# Patient Record
Sex: FEMALE | Race: WHITE | NOT HISPANIC OR LATINO | Employment: FULL TIME | URBAN - NONMETROPOLITAN AREA
[De-identification: names, ages, dates, MRNs, and addresses within clinical notes are randomized per-mention and may not be internally consistent; named-entity substitution may affect disease eponyms.]

---

## 2019-10-02 ENCOUNTER — HISTORICAL (OUTPATIENT)
Dept: ADMINISTRATIVE | Facility: HOSPITAL | Age: 59
End: 2019-10-02

## 2019-11-07 LAB — CRC RECOMMENDATION EXT: NORMAL

## 2020-09-18 LAB
BILIRUB SERPL-MCNC: NEGATIVE MG/DL
BLOOD URINE, POC: NORMAL
CLARITY, POC UA: CLEAR
COLOR, POC UA: YELLOW
GLUCOSE UR QL STRIP: NEGATIVE
KETONES UR QL STRIP: NEGATIVE
LEUKOCYTE EST, POC UA: NORMAL
NITRITE, POC UA: NEGATIVE
PH, POC UA: 7
PROTEIN, POC: NEGATIVE
SPECIFIC GRAVITY, POC UA: 1.02
UROBILINOGEN, POC UA: NORMAL

## 2022-04-11 ENCOUNTER — HISTORICAL (OUTPATIENT)
Dept: ADMINISTRATIVE | Facility: HOSPITAL | Age: 62
End: 2022-04-11

## 2022-04-29 VITALS
HEIGHT: 63 IN | DIASTOLIC BLOOD PRESSURE: 68 MMHG | SYSTOLIC BLOOD PRESSURE: 110 MMHG | BODY MASS INDEX: 19.77 KG/M2 | WEIGHT: 111.56 LBS

## 2022-07-14 ENCOUNTER — HISTORICAL (OUTPATIENT)
Dept: ADMINISTRATIVE | Facility: HOSPITAL | Age: 62
End: 2022-07-14

## 2022-07-14 LAB — BCS RECOMMENDATION EXT: NORMAL

## 2022-09-21 ENCOUNTER — HISTORICAL (OUTPATIENT)
Dept: ADMINISTRATIVE | Facility: HOSPITAL | Age: 62
End: 2022-09-21

## 2023-01-13 ENCOUNTER — DOCUMENTATION ONLY (OUTPATIENT)
Dept: ADMINISTRATIVE | Facility: HOSPITAL | Age: 63
End: 2023-01-13
Payer: COMMERCIAL

## 2023-02-22 ENCOUNTER — TELEPHONE (OUTPATIENT)
Dept: FAMILY MEDICINE | Facility: CLINIC | Age: 63
End: 2023-02-22

## 2023-02-22 ENCOUNTER — OFFICE VISIT (OUTPATIENT)
Dept: FAMILY MEDICINE | Facility: CLINIC | Age: 63
End: 2023-02-22
Payer: COMMERCIAL

## 2023-02-22 VITALS
HEART RATE: 67 BPM | DIASTOLIC BLOOD PRESSURE: 60 MMHG | OXYGEN SATURATION: 98 % | HEIGHT: 63 IN | TEMPERATURE: 99 F | BODY MASS INDEX: 22.04 KG/M2 | SYSTOLIC BLOOD PRESSURE: 128 MMHG | WEIGHT: 124.38 LBS

## 2023-02-22 DIAGNOSIS — L98.9 SKIN LESION: Primary | ICD-10-CM

## 2023-02-22 DIAGNOSIS — H60.11 CELLULITIS OF AURICLE OF RIGHT EAR: Primary | ICD-10-CM

## 2023-02-22 DIAGNOSIS — Z00.01 ABNORMAL WELLNESS EXAM: ICD-10-CM

## 2023-02-22 PROBLEM — E78.00 ELEVATED LOW DENSITY LIPOPROTEIN (LDL) CHOLESTEROL LEVEL: Status: ACTIVE | Noted: 2023-02-22

## 2023-02-22 PROBLEM — G57.90 NEUROPATHY OF LOWER EXTREMITY: Status: ACTIVE | Noted: 2023-02-22

## 2023-02-22 PROBLEM — Z86.0100 HISTORY OF COLONIC POLYPS: Status: ACTIVE | Noted: 2023-02-22

## 2023-02-22 PROBLEM — H66.90 OTITIS: Status: ACTIVE | Noted: 2023-02-22

## 2023-02-22 PROBLEM — R73.03 PREDIABETES: Status: ACTIVE | Noted: 2023-02-22

## 2023-02-22 PROBLEM — Z86.010 HISTORY OF COLONIC POLYPS: Status: ACTIVE | Noted: 2023-02-22

## 2023-02-22 PROBLEM — Z80.3 FAMILY HISTORY OF BREAST CANCER IN FEMALE: Status: ACTIVE | Noted: 2023-02-22

## 2023-02-22 PROBLEM — Z87.891 HISTORY OF SMOKING: Status: ACTIVE | Noted: 2023-02-22

## 2023-02-22 PROCEDURE — 99213 OFFICE O/P EST LOW 20 MIN: CPT | Mod: ,,, | Performed by: NURSE PRACTITIONER

## 2023-02-22 PROCEDURE — 99213 PR OFFICE/OUTPT VISIT, EST, LEVL III, 20-29 MIN: ICD-10-PCS | Mod: ,,, | Performed by: NURSE PRACTITIONER

## 2023-02-22 RX ORDER — TRIAMCINOLONE ACETONIDE 1 MG/G
1 CREAM TOPICAL 2 TIMES DAILY
COMMUNITY
Start: 2022-12-22 | End: 2023-02-22 | Stop reason: SDUPTHER

## 2023-02-22 RX ORDER — TRIAMCINOLONE ACETONIDE 1 MG/G
1 CREAM TOPICAL 2 TIMES DAILY
Qty: 1 EACH | Refills: 0 | Status: SHIPPED | OUTPATIENT
Start: 2023-02-22 | End: 2023-09-28

## 2023-02-22 RX ORDER — CLINDAMYCIN HYDROCHLORIDE 300 MG/1
300 CAPSULE ORAL EVERY 6 HOURS
Qty: 40 CAPSULE | Refills: 0 | Status: SHIPPED | OUTPATIENT
Start: 2023-02-22 | End: 2023-03-04

## 2023-02-22 RX ORDER — IBUPROFEN 600 MG/1
600 TABLET ORAL EVERY 8 HOURS PRN
COMMUNITY
Start: 2022-09-20

## 2023-02-22 NOTE — PROGRESS NOTES
"Patient ID: Angelique Maguire  : 1960     Chief Complaint: sores in ear    Allergies: Patient is allergic to adhesive.     History of Present Illness:  The patient is a 62 y.o. White female who presents to clinic for evaluation and management with a chief complaint of sores in ear   Patient with history of infected bumps on right ear 3 months ago. Resolved with antibiotics and creams. Went to [place] last week and noticed tender bump to same ear. Now has a few bumps, denies any drainage, changes in hearing, fever.        Past Medical History:  has no past medical history on file.    Social History:  reports that she has quit smoking. Her smoking use included cigarettes. She has never used smokeless tobacco.    Care Team: Patient Care Team:  LUCAS Rubalcava as PCP - General (Family Medicine)  Domi Mcdonald     Current Medications:  Current Outpatient Medications   Medication Instructions    clindamycin (CLEOCIN) 300 mg, Oral, Every 6 hours    ibuprofen (ADVIL,MOTRIN) 600 mg, Oral, Every 8 hours PRN    triamcinolone acetonide 0.1% (KENALOG) 1 g, Topical (Top), 2 times daily       Review of Systems   See HPI    Visit Vitals  /60   Pulse 67   Temp 98.8 °F (37.1 °C)   Ht 5' 3" (1.6 m)   Wt 56.4 kg (124 lb 6.4 oz)   LMP  (LMP Unknown)   SpO2 98%   BMI 22.04 kg/m²       Physical Exam  Vitals reviewed.   Constitutional:       General: She is not in acute distress.     Appearance: Normal appearance.   HENT:      Head: Normocephalic and atraumatic.      Right Ear: Tympanic membrane and ear canal normal.      Left Ear: Tympanic membrane, ear canal and external ear normal.      Ears:        Comments: Few pimple like lesions to ryan of right external ear with surrounding erythema and increased warmth. No drainage. Tenderness with palpation.     Nose: Nose normal. No congestion.      Mouth/Throat:      Mouth: Mucous membranes are moist.      Pharynx: Oropharynx is clear. No oropharyngeal exudate or " posterior oropharyngeal erythema.   Eyes:      Conjunctiva/sclera: Conjunctivae normal.   Cardiovascular:      Rate and Rhythm: Normal rate and regular rhythm.      Pulses: Normal pulses.      Heart sounds: No murmur heard.  Pulmonary:      Effort: Pulmonary effort is normal.      Breath sounds: Normal breath sounds.   Musculoskeletal:      Cervical back: Normal range of motion and neck supple.   Lymphadenopathy:      Cervical: No cervical adenopathy.   Skin:     General: Skin is warm and dry.      Capillary Refill: Capillary refill takes less than 2 seconds.      Coloration: Skin is not jaundiced.   Neurological:      Mental Status: She is alert and oriented to person, place, and time.      Cranial Nerves: No cranial nerve deficit.   Psychiatric:         Mood and Affect: Mood normal.         Behavior: Behavior normal.          Assessment & Plan:  1. Cellulitis of auricle of right ear  Comments:  complete abx as prescribed.     Other orders  -     clindamycin (CLEOCIN) 300 MG capsule; Take 1 capsule (300 mg total) by mouth every 6 (six) hours. for 10 days  Dispense: 40 capsule; Refill: 0         Future Appointments   Date Time Provider Department Center   9/26/2023  9:30 AM LUCAS Rubalcava [facility] Mcdonald [facility]     Lab Frequency Next Occurrence   CBC Auto Differential Once 08/22/2023   Comprehensive Metabolic Panel Once 08/22/2023   Lipid Panel Once 08/22/2023   TSH Once 08/22/2023   Hemoglobin A1C Once 08/22/2023       Follow up in about 7 months (around 9/22/2023) for Wellness, Labs prior to apt. Call sooner if needed.    LUCAS RUBALCAVA

## 2023-02-22 NOTE — TELEPHONE ENCOUNTER
Pt called stated she forgot to ask for a refill on her triamcinolone cream    ----- Message from Marie Guardado sent at 2/22/2023 12:15 PM CST -----  Regarding: refill  Pt forgot to ask for refill on cream she doesn't know the name but it starts with the letter t

## 2023-04-13 ENCOUNTER — OFFICE VISIT (OUTPATIENT)
Dept: FAMILY MEDICINE | Facility: CLINIC | Age: 63
End: 2023-04-13
Payer: COMMERCIAL

## 2023-04-13 VITALS
OXYGEN SATURATION: 98 % | TEMPERATURE: 98 F | BODY MASS INDEX: 22.93 KG/M2 | HEIGHT: 63 IN | SYSTOLIC BLOOD PRESSURE: 118 MMHG | DIASTOLIC BLOOD PRESSURE: 60 MMHG | WEIGHT: 129.38 LBS | HEART RATE: 83 BPM

## 2023-04-13 DIAGNOSIS — R31.9 PAINLESS HEMATURIA: Primary | ICD-10-CM

## 2023-04-13 DIAGNOSIS — R31.9 HEMATURIA, UNSPECIFIED TYPE: ICD-10-CM

## 2023-04-13 PROBLEM — H66.90 OTITIS: Status: RESOLVED | Noted: 2023-02-22 | Resolved: 2023-04-13

## 2023-04-13 LAB
BILIRUB SERPL-MCNC: NORMAL MG/DL
BLOOD URINE, POC: NORMAL
CLARITY, POC UA: CLEAR
COLOR, POC UA: YELLOW
GLUCOSE UR QL STRIP: NORMAL
KETONES UR QL STRIP: NORMAL
LEUKOCYTE ESTERASE URINE, POC: NORMAL
NITRITE, POC UA: NORMAL
PH, POC UA: 6
PROTEIN, POC: NORMAL
SPECIFIC GRAVITY, POC UA: 1.01
UROBILINOGEN, POC UA: 0.2

## 2023-04-13 PROCEDURE — 3074F SYST BP LT 130 MM HG: CPT | Mod: CPTII,,, | Performed by: NURSE PRACTITIONER

## 2023-04-13 PROCEDURE — 3008F PR BODY MASS INDEX (BMI) DOCUMENTED: ICD-10-PCS | Mod: CPTII,,, | Performed by: NURSE PRACTITIONER

## 2023-04-13 PROCEDURE — 99213 PR OFFICE/OUTPT VISIT, EST, LEVL III, 20-29 MIN: ICD-10-PCS | Mod: ,,, | Performed by: NURSE PRACTITIONER

## 2023-04-13 PROCEDURE — 99213 OFFICE O/P EST LOW 20 MIN: CPT | Mod: ,,, | Performed by: NURSE PRACTITIONER

## 2023-04-13 PROCEDURE — 1160F PR REVIEW ALL MEDS BY PRESCRIBER/CLIN PHARMACIST DOCUMENTED: ICD-10-PCS | Mod: CPTII,,, | Performed by: NURSE PRACTITIONER

## 2023-04-13 PROCEDURE — 3078F DIAST BP <80 MM HG: CPT | Mod: CPTII,,, | Performed by: NURSE PRACTITIONER

## 2023-04-13 PROCEDURE — 81002 POCT URINE DIPSTICK WITHOUT MICROSCOPE: ICD-10-PCS | Mod: ,,, | Performed by: NURSE PRACTITIONER

## 2023-04-13 PROCEDURE — 1159F PR MEDICATION LIST DOCUMENTED IN MEDICAL RECORD: ICD-10-PCS | Mod: CPTII,,, | Performed by: NURSE PRACTITIONER

## 2023-04-13 PROCEDURE — 81002 URINALYSIS NONAUTO W/O SCOPE: CPT | Mod: ,,, | Performed by: NURSE PRACTITIONER

## 2023-04-13 PROCEDURE — 3078F PR MOST RECENT DIASTOLIC BLOOD PRESSURE < 80 MM HG: ICD-10-PCS | Mod: CPTII,,, | Performed by: NURSE PRACTITIONER

## 2023-04-13 PROCEDURE — 87088 URINE BACTERIA CULTURE: CPT | Performed by: NURSE PRACTITIONER

## 2023-04-13 PROCEDURE — 3008F BODY MASS INDEX DOCD: CPT | Mod: CPTII,,, | Performed by: NURSE PRACTITIONER

## 2023-04-13 PROCEDURE — 1160F RVW MEDS BY RX/DR IN RCRD: CPT | Mod: CPTII,,, | Performed by: NURSE PRACTITIONER

## 2023-04-13 PROCEDURE — 1159F MED LIST DOCD IN RCRD: CPT | Mod: CPTII,,, | Performed by: NURSE PRACTITIONER

## 2023-04-13 PROCEDURE — 3074F PR MOST RECENT SYSTOLIC BLOOD PRESSURE < 130 MM HG: ICD-10-PCS | Mod: CPTII,,, | Performed by: NURSE PRACTITIONER

## 2023-04-13 RX ORDER — CYCLOBENZAPRINE HCL 10 MG
10 TABLET ORAL 3 TIMES DAILY PRN
COMMUNITY
Start: 2023-03-05 | End: 2023-09-28

## 2023-04-13 RX ORDER — NITROFURANTOIN 25; 75 MG/1; MG/1
100 CAPSULE ORAL 2 TIMES DAILY
Qty: 10 CAPSULE | Refills: 0 | Status: SHIPPED | OUTPATIENT
Start: 2023-04-13 | End: 2023-09-28

## 2023-04-13 RX ORDER — NAPROXEN 500 MG/1
500 TABLET ORAL 2 TIMES DAILY
COMMUNITY
Start: 2023-04-07 | End: 2023-09-28

## 2023-04-13 NOTE — PROGRESS NOTES
Patient ID: Angelique Maguire  : 1960     Chief Complaint: Hematuria    Allergies: Patient is allergic to adhesive.     History of Present Illness:  The patient is a 62 y.o. White female who presents to clinic for evaluation and management with a chief complaint of Hematuria   Patient reports noting blood in urine for one day about 2 months ago. Recurred once last week and again this am. Does have some mild intermittent right lower abdominal/groin pain but believes that is related to her back. Denies urinary frequency, dysuria, urgency, hesitancy, difficulty passing urine, cloudy urine, fever, n/v/d, constipation. Denies vaginal discharge. History of total hysterectomy.        Past Medical History:  has no past medical history on file.    Social History:  reports that she has quit smoking. Her smoking use included cigarettes. She has never used smokeless tobacco.    Care Team: Patient Care Team:  LUCAS Rubalcava as PCP - General (Family Medicine)  Domi Mcdonald     Current Medications:  Current Outpatient Medications   Medication Instructions    cyclobenzaprine (FLEXERIL) 10 mg, Oral, 3 times daily PRN    ibuprofen (ADVIL,MOTRIN) 600 mg, Oral, Every 8 hours PRN    naproxen (NAPROSYN) 500 mg, Oral, 2 times daily    nitrofurantoin, macrocrystal-monohydrate, (MACROBID) 100 MG capsule 100 mg, Oral, 2 times daily    triamcinolone acetonide 0.1% (KENALOG) 1,000 mg, Topical (Top), 2 times daily       Review of Systems   Constitutional:  Negative for appetite change, fatigue, fever and unexpected weight change.   HENT:  Negative for nasal congestion, ear pain, facial swelling, hearing loss, mouth sores, nosebleeds, sore throat and trouble swallowing.    Eyes:  Negative for pain, discharge, redness and visual disturbance.   Respiratory:  Negative for cough, chest tightness and shortness of breath.    Cardiovascular:  Negative for chest pain, palpitations and leg swelling.   Gastrointestinal:  Negative for  "abdominal pain, blood in stool, constipation, diarrhea and nausea.   Endocrine: Negative for cold intolerance, heat intolerance, polydipsia, polyphagia and polyuria.   Genitourinary:  Positive for hematuria. Negative for difficulty urinating, dysuria, flank pain, frequency, urgency, vaginal bleeding, vaginal discharge and vaginal pain.   Musculoskeletal:  Negative for joint swelling and joint deformity.   Integumentary:  Negative for color change, rash and mole/lesion.   Neurological:  Negative for dizziness, weakness, headaches and memory loss.   Hematological:  Negative for adenopathy. Does not bruise/bleed easily.   Psychiatric/Behavioral:  Negative for confusion, sleep disturbance and suicidal ideas.       Visit Vitals  /60   Pulse 83   Temp 97.5 °F (36.4 °C)   Ht 5' 2.99" (1.6 m)   Wt 58.7 kg (129 lb 6.4 oz)   LMP  (LMP Unknown)   SpO2 98%   BMI 22.93 kg/m²       Physical Exam  Vitals reviewed.   Constitutional:       General: She is not in acute distress.     Appearance: Normal appearance.   HENT:      Head: Normocephalic and atraumatic.      Nose: Nose normal. No congestion.      Mouth/Throat:      Mouth: Mucous membranes are moist.      Pharynx: Oropharynx is clear.   Eyes:      Conjunctiva/sclera: Conjunctivae normal.   Cardiovascular:      Rate and Rhythm: Normal rate and regular rhythm.      Pulses: Normal pulses.   Pulmonary:      Effort: Pulmonary effort is normal.      Breath sounds: Normal breath sounds.   Abdominal:      General: Bowel sounds are normal.      Palpations: Abdomen is soft.      Tenderness: There is abdominal tenderness in the suprapubic area and left lower quadrant.   Skin:     General: Skin is warm and dry.      Coloration: Skin is not jaundiced.      Findings: No rash.   Neurological:      Mental Status: She is alert and oriented to person, place, and time.      Cranial Nerves: No cranial nerve deficit.   Psychiatric:         Mood and Affect: Mood normal.         Behavior: " Behavior normal.      Assessment & Plan:  1. Painless hematuria  Comments:  discussed possible causes, obtain u/s , begin macrobid while awaiting urine culture results. Patient will consider urology referral   Orders:  -     nitrofurantoin, macrocrystal-monohydrate, (MACROBID) 100 MG capsule; Take 1 capsule (100 mg total) by mouth 2 (two) times daily.  Dispense: 10 capsule; Refill: 0  -     Urine culture  -     US Retroperitoneal Complete; Future; Expected date: 04/13/2023    2. Hematuria, unspecified type  -     POCT URINE DIPSTICK WITHOUT MICROSCOPE         Future Appointments   Date Time Provider Department Center   9/20/2023  8:00 AM LAB, [facility] LABORATORY DRAW STATION [facility] ZHOU Cruz   9/28/2023  8[facility]30 [facility]       Follow up if symptoms worsen or fail to improve, for Keep Apt as Scheduled. Call sooner if needed.    LUCAS RUBALCAVA    Lab Frequency Next Occurrence   US Retroperitoneal Complete Once 04/13/2023   CBC Auto Differential Once 08/22/2023   Comprehensive Metabolic Panel Once 08/22/2023   Lipid Panel Once 08/22/2023   TSH Once 08/22/2023   Hemoglobin A1C Once 08/22/2023

## 2023-04-16 LAB — BACTERIA UR CULT: NO GROWTH

## 2023-04-17 ENCOUNTER — TELEPHONE (OUTPATIENT)
Dept: FAMILY MEDICINE | Facility: CLINIC | Age: 63
End: 2023-04-17
Payer: COMMERCIAL

## 2023-04-17 NOTE — TELEPHONE ENCOUNTER
----- Message from LUCAS Rubalcava sent at 4/13/2023 11:38 AM CDT -----  Please f/u on urine culture. Placed patient on macrobid bid on Thursday 4/13/23.

## 2023-04-18 ENCOUNTER — TELEPHONE (OUTPATIENT)
Dept: FAMILY MEDICINE | Facility: CLINIC | Age: 63
End: 2023-04-18
Payer: COMMERCIAL

## 2023-04-18 NOTE — TELEPHONE ENCOUNTER
----- Message from LUCAS Rubalcava sent at 4/18/2023  3:06 PM CDT -----  Notify patient test results are normal. No growth in urine culture. Any further blood in urine? Does she want to see a urologist?

## 2023-04-18 NOTE — PROGRESS NOTES
Notify patient test results are normal. No growth in urine culture. Any further blood in urine? Does she want to see a urologist?

## 2023-04-18 NOTE — TELEPHONE ENCOUNTER
Pt states she is feeling better and denies having blood in her urine. She states that she does not have a urologist.

## 2023-04-19 ENCOUNTER — TELEPHONE (OUTPATIENT)
Dept: FAMILY MEDICINE | Facility: CLINIC | Age: 63
End: 2023-04-19
Payer: COMMERCIAL

## 2023-04-19 ENCOUNTER — HOSPITAL ENCOUNTER (OUTPATIENT)
Dept: RADIOLOGY | Facility: HOSPITAL | Age: 63
Discharge: HOME | End: 2023-04-19
Attending: NURSE PRACTITIONER
Payer: COMMERCIAL

## 2023-04-19 DIAGNOSIS — R31.9 PAINLESS HEMATURIA: ICD-10-CM

## 2023-04-19 PROCEDURE — 76770 US EXAM ABDO BACK WALL COMP: CPT | Mod: TC

## 2023-04-19 NOTE — PROGRESS NOTES
Notify patient us of kidneys and bladder is normal. No further orders. Keep next appointment as scheduled.

## 2023-04-19 NOTE — TELEPHONE ENCOUNTER
----- Message from LUCAS Rubalcava sent at 4/19/2023  3:40 PM CDT -----  Notify patient us of kidneys and bladder is normal. No further orders. Keep next appointment as scheduled.

## 2023-09-20 PROCEDURE — 83036 HEMOGLOBIN GLYCOSYLATED A1C: CPT | Performed by: NURSE PRACTITIONER

## 2023-09-20 PROCEDURE — 80061 LIPID PANEL: CPT | Performed by: NURSE PRACTITIONER

## 2023-09-20 PROCEDURE — 85025 COMPLETE CBC W/AUTO DIFF WBC: CPT | Performed by: NURSE PRACTITIONER

## 2023-09-20 PROCEDURE — 84443 ASSAY THYROID STIM HORMONE: CPT | Performed by: NURSE PRACTITIONER

## 2023-09-20 PROCEDURE — 80053 COMPREHEN METABOLIC PANEL: CPT | Performed by: NURSE PRACTITIONER

## 2023-09-28 ENCOUNTER — OFFICE VISIT (OUTPATIENT)
Dept: FAMILY MEDICINE | Facility: CLINIC | Age: 63
End: 2023-09-28
Payer: COMMERCIAL

## 2023-09-28 VITALS
BODY MASS INDEX: 23 KG/M2 | OXYGEN SATURATION: 97 % | DIASTOLIC BLOOD PRESSURE: 60 MMHG | WEIGHT: 129.81 LBS | HEART RATE: 102 BPM | TEMPERATURE: 99 F | SYSTOLIC BLOOD PRESSURE: 112 MMHG | HEIGHT: 63 IN

## 2023-09-28 DIAGNOSIS — Z12.2 SCREENING FOR LUNG CANCER: ICD-10-CM

## 2023-09-28 DIAGNOSIS — E78.49 OTHER HYPERLIPIDEMIA: ICD-10-CM

## 2023-09-28 DIAGNOSIS — Z00.00 WELLNESS EXAMINATION: Primary | ICD-10-CM

## 2023-09-28 DIAGNOSIS — Z12.31 SCREENING MAMMOGRAM FOR BREAST CANCER: ICD-10-CM

## 2023-09-28 DIAGNOSIS — R73.03 PREDIABETES: ICD-10-CM

## 2023-09-28 DIAGNOSIS — J06.9 ACUTE UPPER RESPIRATORY INFECTION: ICD-10-CM

## 2023-09-28 DIAGNOSIS — M54.16 LUMBAR RADICULOPATHY: ICD-10-CM

## 2023-09-28 DIAGNOSIS — Z23 IMMUNIZATION DUE: ICD-10-CM

## 2023-09-28 PROCEDURE — 1159F MED LIST DOCD IN RCRD: CPT | Mod: CPTII,,, | Performed by: NURSE PRACTITIONER

## 2023-09-28 PROCEDURE — 99396 PR PREVENTIVE VISIT,EST,40-64: ICD-10-PCS | Mod: 25,,, | Performed by: NURSE PRACTITIONER

## 2023-09-28 PROCEDURE — 3078F PR MOST RECENT DIASTOLIC BLOOD PRESSURE < 80 MM HG: ICD-10-PCS | Mod: CPTII,,, | Performed by: NURSE PRACTITIONER

## 2023-09-28 PROCEDURE — 90686 FLU VACCINE (QUAD) GREATER THAN OR EQUAL TO 3YO PRESERVATIVE FREE IM: ICD-10-PCS | Mod: ,,, | Performed by: NURSE PRACTITIONER

## 2023-09-28 PROCEDURE — 1160F RVW MEDS BY RX/DR IN RCRD: CPT | Mod: CPTII,,, | Performed by: NURSE PRACTITIONER

## 2023-09-28 PROCEDURE — 90471 FLU VACCINE (QUAD) GREATER THAN OR EQUAL TO 3YO PRESERVATIVE FREE IM: ICD-10-PCS | Mod: ,,, | Performed by: NURSE PRACTITIONER

## 2023-09-28 PROCEDURE — 1159F PR MEDICATION LIST DOCUMENTED IN MEDICAL RECORD: ICD-10-PCS | Mod: CPTII,,, | Performed by: NURSE PRACTITIONER

## 2023-09-28 PROCEDURE — 3074F PR MOST RECENT SYSTOLIC BLOOD PRESSURE < 130 MM HG: ICD-10-PCS | Mod: CPTII,,, | Performed by: NURSE PRACTITIONER

## 2023-09-28 PROCEDURE — 3078F DIAST BP <80 MM HG: CPT | Mod: CPTII,,, | Performed by: NURSE PRACTITIONER

## 2023-09-28 PROCEDURE — 3008F PR BODY MASS INDEX (BMI) DOCUMENTED: ICD-10-PCS | Mod: CPTII,,, | Performed by: NURSE PRACTITIONER

## 2023-09-28 PROCEDURE — 3044F HG A1C LEVEL LT 7.0%: CPT | Mod: CPTII,,, | Performed by: NURSE PRACTITIONER

## 2023-09-28 PROCEDURE — 3074F SYST BP LT 130 MM HG: CPT | Mod: CPTII,,, | Performed by: NURSE PRACTITIONER

## 2023-09-28 PROCEDURE — 1160F PR REVIEW ALL MEDS BY PRESCRIBER/CLIN PHARMACIST DOCUMENTED: ICD-10-PCS | Mod: CPTII,,, | Performed by: NURSE PRACTITIONER

## 2023-09-28 PROCEDURE — 90471 IMMUNIZATION ADMIN: CPT | Mod: ,,, | Performed by: NURSE PRACTITIONER

## 2023-09-28 PROCEDURE — 90686 IIV4 VACC NO PRSV 0.5 ML IM: CPT | Mod: ,,, | Performed by: NURSE PRACTITIONER

## 2023-09-28 PROCEDURE — 3044F PR MOST RECENT HEMOGLOBIN A1C LEVEL <7.0%: ICD-10-PCS | Mod: CPTII,,, | Performed by: NURSE PRACTITIONER

## 2023-09-28 PROCEDURE — 3008F BODY MASS INDEX DOCD: CPT | Mod: CPTII,,, | Performed by: NURSE PRACTITIONER

## 2023-09-28 PROCEDURE — 99396 PREV VISIT EST AGE 40-64: CPT | Mod: 25,,, | Performed by: NURSE PRACTITIONER

## 2023-09-28 NOTE — PROGRESS NOTES
Patient ID: Angelique Maguire  : 1960    Chief Complaint: wellness     Allergies: Patient is allergic to adhesive.     History of Present Illness:  The patient is a 63 y.o. White female who presents to clinic for annual wellness visit.    Diet and nutrition:  Diet is high in salt, high in fat, low in fiber, high caloric intake, high carbohydrate meals, high calcium intake.    Fracture risk: No history of fracture, no recent unexplained fracture.    Physical activity:  Does not exercise on a regular basis, good physical condition.    Depression risks:  No history of depression, never feel sad, empty, or tearful, no sleep disturbances, no agitation, no loss of energy, no feelings of worthlessness or guilt, no thoughts of suicide.    Orientation:  No disorientation to time, no disorientation to place.    Concentration and memory:  No decreased concentration ability, no memory lapses or loss, does not forget words.    Speech forward/motor difficulties: No speech difficulties, no difficulty expressing formulated concepts, no difficulty with fine manipulative tasks, no difficulty writing forward/copying, no slowed reaction time, does not knock things over when trying to pick them up.    Fall risk assessment:  No frequent falls while walking, no fall in the past year, no dizziness forward/vertigo, no fear of falling.  Hearing:  No loss of hearing, does not wear hearing aids.    Vision:  No vision problems, does wear glasses.    Activity of daily living: Able to bathe with limited or no assistance, able to control urination and bowels, able to dress with limited or no assistance, able to feed self with limited or no assistance, able to get out of chair or bed with limited or no assistance, able to [place] with limited or no assistance, able to toilet with limited are no assistance.    Activities of daily living:  Able to do housework with limited or no assistance, able to grocery shop with limited or no assistance,  able to manage medications with limited or no assistance, able to manage money with limited or no assistance, able to prepare meals with limited or no assistance, able to use the phone with limited or no assistance.    Screenings: due for vaccinations, due for breast cancer screening, due for cervical cancer screening- hysterectomy, not due for colorectal cancer screening 11/17/19 colonoscopy- repeat 5 yrs    Reports runny nose and congestion  began about 5-6 days ago. Throat is feeling better. Taking otc sinus medication. Denies fever, sob, wheezing.    Past Medical History:  has no past medical history on file.    Surgical History:  has a past surgical history that includes Colonoscopy (11/07/2019); Hysterectomy; and Tubal ligation.    Family History: family history includes Alzheimer's disease in her mother; Cancer in her father.    Social History:  reports that she has quit smoking. Her smoking use included cigarettes. She has never used smokeless tobacco.    Care Team: Patient Care Team:  Ryder Rooney APRN as PCP - General (Family Medicine)  Mcdonald, [employer]     Current Medications:  Current Outpatient Medications   Medication Instructions    ibuprofen (ADVIL,MOTRIN) 600 mg, Oral, Every 8 hours PRN       Review of Systems   Constitutional:  Negative for appetite change, fatigue, fever and unexpected weight change.   HENT:  Positive for nasal congestion, postnasal drip, rhinorrhea and sneezing. Negative for ear pain, facial swelling, hearing loss, mouth sores, nosebleeds, sore throat and trouble swallowing.    Eyes:  Negative for pain, discharge, redness and visual disturbance.   Respiratory:  Negative for cough, chest tightness and shortness of breath.    Cardiovascular:  Negative for chest pain, palpitations and leg swelling.   Gastrointestinal:  Negative for abdominal pain, blood in stool, constipation, diarrhea and nausea.   Endocrine: Negative for cold intolerance, heat intolerance, polydipsia,  "polyphagia and polyuria.   Genitourinary:  Negative for difficulty urinating, dysuria, flank pain, frequency, urgency, vaginal bleeding, vaginal discharge and vaginal pain.   Musculoskeletal:  Positive for arthralgias and back pain. Negative for joint swelling and joint deformity.   Integumentary:  Negative for color change, rash and mole/lesion.   Neurological:  Negative for dizziness, weakness, headaches and memory loss.   Hematological:  Negative for adenopathy. Does not bruise/bleed easily.   Psychiatric/Behavioral:  Negative for confusion, sleep disturbance and suicidal ideas.         Visit Vitals  /60 (BP Location: Right arm, Patient Position: Sitting)   Pulse 102   Temp 98.8 °F (37.1 °C)   Ht 5' 2.99" (1.6 m)   Wt 58.9 kg (129 lb 12.8 oz)   LMP  (LMP Unknown)   SpO2 97%   BMI 23.00 kg/m²       Physical Exam  Vitals reviewed.   Constitutional:       General: She is not in acute distress.     Appearance: Normal appearance.   HENT:      Head: Normocephalic and atraumatic.      Right Ear: Ear canal and external ear normal. A middle ear effusion is present. Tympanic membrane is not injected.      Left Ear: Ear canal and external ear normal. A middle ear effusion is present. Tympanic membrane is not injected.      Nose: Rhinorrhea present. No congestion. Rhinorrhea is clear.      Right Sinus: No maxillary sinus tenderness or frontal sinus tenderness.      Left Sinus: No maxillary sinus tenderness or frontal sinus tenderness.      Mouth/Throat:      Mouth: Mucous membranes are moist.      Dentition: Has dentures.      Pharynx: Oropharynx is clear. No oropharyngeal exudate or posterior oropharyngeal erythema.   Eyes:      Extraocular Movements: Extraocular movements intact.      Conjunctiva/sclera: Conjunctivae normal.      Pupils: Pupils are equal, round, and reactive to light.   Cardiovascular:      Rate and Rhythm: Normal rate and regular rhythm.      Pulses: Normal pulses.      Heart sounds: No murmur " heard.  Pulmonary:      Effort: Pulmonary effort is normal.      Breath sounds: Normal breath sounds.   Musculoskeletal:         General: No swelling or deformity.      Cervical back: Normal range of motion and neck supple.   Lymphadenopathy:      Cervical: No cervical adenopathy.   Skin:     General: Skin is warm and dry.      Capillary Refill: Capillary refill takes less than 2 seconds.      Coloration: Skin is not jaundiced.      Findings: No rash.   Neurological:      Mental Status: She is alert and oriented to person, place, and time.      Cranial Nerves: No cranial nerve deficit.   Psychiatric:         Mood and Affect: Mood normal.         Behavior: Behavior normal.          Labs Reviewed:  Chemistry:  Lab Results   Component Value Date     09/20/2023    K 4.1 09/20/2023    CHLORIDE 101 09/20/2023    BUN 11.0 09/20/2023    CREATININE 0.70 09/20/2023    EGFRNORACEVR >90 09/20/2023    GLUCOSE 99 09/20/2023    CALCIUM 8.7 09/20/2023    ALKPHOS 70 09/20/2023    LABPROT 6.6 09/20/2023    ALBUMIN 4.3 09/20/2023    AST 29 09/20/2023    ALT 18 09/20/2023    TSH 0.857 09/20/2023        Lab Results   Component Value Date    HGBA1C 5.7 09/20/2023        Hematology:  Lab Results   Component Value Date    WBC 6.34 09/20/2023    RBC 4.69 09/20/2023    HGB 14.1 09/20/2023    HCT 41.5 09/20/2023    MCV 88.5 09/20/2023    MCH 30.1 09/20/2023    MCHC 34.0 09/20/2023    RDW 12.8 09/20/2023     09/20/2023    MPV 9.2 (L) 09/20/2023       Lipid Panel:  Lab Results   Component Value Date    CHOL 209 (H) 09/20/2023    HDL 76 (H) 09/20/2023    DLDL 115.8 (H) 09/20/2023    TRIG 51 09/20/2023        Assessment & Plan:  1. Wellness examination  Overview:  Cervical Cancer Screening- hysterectomy  Breast Cancer Screening-mammogram ordered  Osteoporosis Screening-not due  Colon Cancer Screening -  Colonoscopy 11/17/19 repeat 5 yrs  Eye Exam- Recommend annually. Established with [facility]  Dental Exam- Recommend  biannually.      2. Prediabetes  Overview:  Diagnosed 9/19/22 hb a1c 5.8, lifestyle modifications    Assessment & Plan:  Diabetes labs:   Lab Results   Component Value Date    HGBA1C 5.7 09/20/2023            3. Other hyperlipidemia  Overview:  9/19/22 ldl 117, lifestyle modification    Assessment & Plan:  Lipid Panel:  Lab Results   Component Value Date    CHOL 209 (H) 09/20/2023    HDL 76 (H) 09/20/2023    DLDL 115.8 (H) 09/20/2023    TRIG 51 09/20/2023    AST 29 09/20/2023    ALT 18 09/20/2023    ALKPHOS 70 09/20/2023    LABPROT 6.6 09/20/2023    ALBUMIN 4.3 09/20/2023      The 10-year ASCVD risk score (Kajal NOLAND, et al., 2019) is: 2.4%    Values used to calculate the score:      Age: 63 years      Sex: Female      Is Non- : No      Diabetic: No      Tobacco smoker: No      Systolic Blood Pressure: 112 mmHg      Is BP treated: No      HDL Cholesterol: 76 mg/dL      Total Cholesterol: 209 mg/dL        4. Acute upper respiratory infection  Assessment & Plan:  Discussed viral vs bacterial infections. Educated on proper technique for nasal sprays and to notify if symptoms worsen or fail to improve over next week or develop temperature greater than 101.Will call out antibiotics. Discussed symptomatic treatment with OTC medications. Encouraged rest and hydration. Patient states understanding.         5. Screening mammogram for breast cancer  -     Mammo Digital Screening Bilat w/ Jose; Future; Expected date: 09/28/2023    6. Immunization due  -     Influenza - Quadrivalent *Preferred* (6 months+) (PF)    7. Screening for lung cancer  -     CT Chest Lung Screening Low Dose; Future; Expected date: 09/28/2023    8. Lumbar radiculopathy  Overview:  Treatment per Dr. Nolan Marie, receiving steroid injections               Vaccinations:  Immunization History   Administered Date(s) Administered    COVID-19, MRNA, LN-S, PF (MODERNA FULL 0.5 ML DOSE) 07/30/2021, 09/04/2021    DTaP 1960,  [birth year]    Influenza (FLUBLOK) - Quadrivalent - Recombinant - PF *Preferred* (egg allergy) 11/09/2020, 10/03/2022    Influenza - Quadrivalent - PF *Preferred* (6 months and older) 12/14/2019, 09/28/2023    MMR 07/03/2002    OPV 1960, 1960    Td - PF (ADULT) 07/03/2002    Tdap 05/12/2015, 09/27/2019       Future Appointments   Date Time Provider Department Center   9/27/2024  8:20 AM LAB, [facility] LABORATORY DRAW STATION [facility] ZHOU Cruz   10/1/2024  1:30 PM Ryder Rooney APRN [facility]       Follow up for 1 year, Wellness, Fasting labs prior. Call sooner if needed.    LUCAS COOK       Lab Frequency Next Occurrence   Mammo Digital Screening Bilat w/ Jose Once 09/28/2023   CT Chest Lung Screening Low Dose Once 09/28/2023   CBC Auto Differential Once 09/28/2024   Comprehensive Metabolic Panel Once 09/28/2024   Lipid Panel Once 09/28/2024   TSH Once 09/28/2024   Hemoglobin A1C Once 09/28/2024

## 2023-09-28 NOTE — ASSESSMENT & PLAN NOTE
Lipid Panel:  Lab Results   Component Value Date    CHOL 209 (H) 09/20/2023    HDL 76 (H) 09/20/2023    DLDL 115.8 (H) 09/20/2023    TRIG 51 09/20/2023    AST 29 09/20/2023    ALT 18 09/20/2023    ALKPHOS 70 09/20/2023    LABPROT 6.6 09/20/2023    ALBUMIN 4.3 09/20/2023      The 10-year ASCVD risk score (Kajal NOLAND, et al., 2019) is: 2.4%    Values used to calculate the score:      Age: 63 years      Sex: Female      Is Non- : No      Diabetic: No      Tobacco smoker: No      Systolic Blood Pressure: 112 mmHg      Is BP treated: No      HDL Cholesterol: 76 mg/dL      Total Cholesterol: 209 mg/dL

## 2023-10-01 ENCOUNTER — PATIENT MESSAGE (OUTPATIENT)
Dept: FAMILY MEDICINE | Facility: CLINIC | Age: 63
End: 2023-10-01
Payer: COMMERCIAL

## 2023-10-01 DIAGNOSIS — J01.90 ACUTE NON-RECURRENT SINUSITIS, UNSPECIFIED LOCATION: Primary | ICD-10-CM

## 2023-10-02 ENCOUNTER — TELEPHONE (OUTPATIENT)
Dept: FAMILY MEDICINE | Facility: CLINIC | Age: 63
End: 2023-10-02
Payer: COMMERCIAL

## 2023-10-02 RX ORDER — AMOXICILLIN AND CLAVULANATE POTASSIUM 875; 125 MG/1; MG/1
1 TABLET, FILM COATED ORAL 2 TIMES DAILY
Qty: 20 TABLET | Refills: 0 | Status: SHIPPED | OUTPATIENT
Start: 2023-10-02 | End: 2023-10-12

## 2023-10-11 ENCOUNTER — HOSPITAL ENCOUNTER (OUTPATIENT)
Dept: RADIOLOGY | Facility: HOSPITAL | Age: 63
Discharge: HOME | End: 2023-10-11
Attending: NURSE PRACTITIONER
Payer: COMMERCIAL

## 2023-10-11 DIAGNOSIS — Z12.31 SCREENING MAMMOGRAM FOR BREAST CANCER: ICD-10-CM

## 2023-10-11 PROCEDURE — 77067 SCR MAMMO BI INCL CAD: CPT | Mod: TC

## 2023-10-12 ENCOUNTER — PATIENT MESSAGE (OUTPATIENT)
Dept: FAMILY MEDICINE | Facility: CLINIC | Age: 63
End: 2023-10-12
Payer: COMMERCIAL

## 2023-10-12 ENCOUNTER — TELEPHONE (OUTPATIENT)
Dept: FAMILY MEDICINE | Facility: CLINIC | Age: 63
End: 2023-10-12
Payer: COMMERCIAL

## 2023-10-12 NOTE — TELEPHONE ENCOUNTER
----- Message from LUCAS Rubalcava sent at 10/12/2023  3:02 PM CDT -----  Normal MMG. Repeat in 1 year.

## 2024-01-01 PROBLEM — Z00.00 WELLNESS EXAMINATION: Status: RESOLVED | Noted: 2023-09-28 | Resolved: 2024-01-01

## 2024-01-11 NOTE — TELEPHONE ENCOUNTER
----- Message from LUCAS Rubalcava sent at 4/19/2023  3:40 PM CDT -----  Notify patient us of kidneys and bladder is normal. No further orders. Keep next appointment as scheduled.    Male

## 2024-04-22 ENCOUNTER — OFFICE VISIT (OUTPATIENT)
Dept: FAMILY MEDICINE | Facility: CLINIC | Age: 64
End: 2024-04-22
Payer: COMMERCIAL

## 2024-04-22 VITALS
TEMPERATURE: 98 F | OXYGEN SATURATION: 97 % | BODY MASS INDEX: 22.15 KG/M2 | HEIGHT: 63 IN | SYSTOLIC BLOOD PRESSURE: 100 MMHG | DIASTOLIC BLOOD PRESSURE: 68 MMHG | HEART RATE: 84 BPM | WEIGHT: 125 LBS

## 2024-04-22 DIAGNOSIS — J01.00 ACUTE NON-RECURRENT MAXILLARY SINUSITIS: Primary | ICD-10-CM

## 2024-04-22 DIAGNOSIS — R05.1 ACUTE COUGH: ICD-10-CM

## 2024-04-22 PROCEDURE — 1159F MED LIST DOCD IN RCRD: CPT | Mod: CPTII,,, | Performed by: NURSE PRACTITIONER

## 2024-04-22 PROCEDURE — 3074F SYST BP LT 130 MM HG: CPT | Mod: CPTII,,, | Performed by: NURSE PRACTITIONER

## 2024-04-22 PROCEDURE — 1160F RVW MEDS BY RX/DR IN RCRD: CPT | Mod: CPTII,,, | Performed by: NURSE PRACTITIONER

## 2024-04-22 PROCEDURE — 3078F DIAST BP <80 MM HG: CPT | Mod: CPTII,,, | Performed by: NURSE PRACTITIONER

## 2024-04-22 PROCEDURE — 99213 OFFICE O/P EST LOW 20 MIN: CPT | Mod: ,,, | Performed by: NURSE PRACTITIONER

## 2024-04-22 PROCEDURE — 3008F BODY MASS INDEX DOCD: CPT | Mod: CPTII,,, | Performed by: NURSE PRACTITIONER

## 2024-04-22 RX ORDER — GABAPENTIN 300 MG/1
300 CAPSULE ORAL NIGHTLY
COMMUNITY
Start: 2024-04-01

## 2024-04-22 RX ORDER — CEFDINIR 300 MG/1
300 CAPSULE ORAL 2 TIMES DAILY
Qty: 20 CAPSULE | Refills: 0 | Status: SHIPPED | OUTPATIENT
Start: 2024-04-22 | End: 2024-05-02

## 2024-04-22 NOTE — PROGRESS NOTES
Patient ID: Angelique Maguire  : 1960     Chief Complaint: Cough, Conjunctivitis, and Hoarse (X 6 days )    Allergies: Patient is allergic to adhesive.     History of Present Illness:  The patient is a 63 y.o. White female who presents to clinic for evaluation and management with a chief complaint of Cough, Conjunctivitis, and Hoarse (X 6 days )   Began with sinus symptoms 2 weeks ago but progressively worse over past 6 days. Patient reports 101.2 temp 4 days ago. Has been taking otc sinus medications, recola, ibuprofen.     Cough  This is a new problem. The current episode started in the past 7 days. The problem has been gradually worsening. The problem occurs every few minutes. The cough is Non-productive. Associated symptoms include nasal congestion, postnasal drip and rhinorrhea. Pertinent negatives include no ear congestion, ear pain, heartburn, hemoptysis, shortness of breath, sweats, weight loss or wheezing.        Past Medical History:  has no past medical history on file.    Social History:  reports that she quit smoking about 6 years ago. Her smoking use included cigarettes. She has a 60 pack-year smoking history. She has never used smokeless tobacco. She reports that she does not drink alcohol and does not use drugs.    Care Team: Patient Care Team:  Ryder Rooney APRN as PCP - General (Family Medicine)  [facility]  Nolan Marie Jr., MD     Current Medications:  Current Outpatient Medications   Medication Instructions    cefdinir (OMNICEF) 300 mg, Oral, 2 times daily    gabapentin (NEURONTIN) 300 mg, Nightly    ibuprofen (ADVIL,MOTRIN) 600 mg, Oral, Every 8 hours PRN       Review of Systems   Constitutional:  Negative for weight loss.   HENT:  Positive for postnasal drip and rhinorrhea. Negative for ear pain.    Respiratory:  Positive for cough. Negative for hemoptysis, shortness of breath and wheezing.    Gastrointestinal:  Negative for heartburn.        Visit Vitals  BP  "100/68 (BP Location: Right arm)   Pulse 84   Temp 97.9 °F (36.6 °C) (Temporal)   Ht 5' 2.99" (1.6 m)   Wt 56.7 kg (125 lb)   LMP  (LMP Unknown)   SpO2 97%   BMI 22.15 kg/m²       Physical Exam  Vitals reviewed.   Constitutional:       General: She is not in acute distress.     Appearance: Normal appearance.   HENT:      Head: Normocephalic and atraumatic.      Right Ear: Ear canal and external ear normal. A middle ear effusion is present. Tympanic membrane is not injected.      Left Ear: Ear canal and external ear normal. A middle ear effusion is present. Tympanic membrane is injected.      Nose: Rhinorrhea present. No congestion. Rhinorrhea is clear and purulent.      Left Turbinates: Enlarged and swollen.      Right Sinus: No maxillary sinus tenderness or frontal sinus tenderness.      Left Sinus: Maxillary sinus tenderness present. No frontal sinus tenderness.      Mouth/Throat:      Mouth: Mucous membranes are moist.      Dentition: Has dentures.      Pharynx: Oropharynx is clear. No oropharyngeal exudate or posterior oropharyngeal erythema.   Eyes:      Extraocular Movements: Extraocular movements intact.      Conjunctiva/sclera: Conjunctivae normal.      Pupils: Pupils are equal, round, and reactive to light.   Cardiovascular:      Rate and Rhythm: Normal rate and regular rhythm.      Pulses: Normal pulses.      Heart sounds: No murmur heard.  Pulmonary:      Effort: Pulmonary effort is normal.      Breath sounds: Normal breath sounds.   Musculoskeletal:         General: No swelling or deformity.   Lymphadenopathy:      Cervical: Cervical adenopathy present.   Skin:     General: Skin is warm and dry.      Capillary Refill: Capillary refill takes less than 2 seconds.      Coloration: Skin is not jaundiced.      Findings: No rash.   Neurological:      Mental Status: She is alert and oriented to person, place, and time.      Cranial Nerves: No cranial nerve deficit.   Psychiatric:         Mood and Affect: Mood " normal.         Behavior: Behavior normal.          Labs Reviewed:  Chemistry:  Lab Results   Component Value Date     09/20/2023    K 4.1 09/20/2023    CHLORIDE 101 09/20/2023    BUN 11.0 09/20/2023    CREATININE 0.70 09/20/2023    EGFRNORACEVR >90 09/20/2023    GLUCOSE 99 09/20/2023    CALCIUM 8.7 09/20/2023    ALKPHOS 70 09/20/2023    LABPROT 6.6 09/20/2023    ALBUMIN 4.3 09/20/2023    AST 29 09/20/2023    ALT 18 09/20/2023    TSH 0.857 09/20/2023        Lab Results   Component Value Date    HGBA1C 5.7 09/20/2023        Hematology:  Lab Results   Component Value Date    WBC 6.34 09/20/2023    RBC 4.69 09/20/2023    HGB 14.1 09/20/2023    HCT 41.5 09/20/2023    MCV 88.5 09/20/2023    MCH 30.1 09/20/2023    MCHC 34.0 09/20/2023    RDW 12.8 09/20/2023     09/20/2023    MPV 9.2 (L) 09/20/2023       Lipid Panel:  Lab Results   Component Value Date    CHOL 209 (H) 09/20/2023    HDL 76 (H) 09/20/2023    DLDL 115.8 (H) 09/20/2023    TRIG 51 09/20/2023        Assessment & Plan:  1. Acute non-recurrent maxillary sinusitis  Comments:  complete abx as prescibed  Orders:  -     cefdinir (OMNICEF) 300 MG capsule; Take 1 capsule (300 mg total) by mouth 2 (two) times daily. for 10 days  Dispense: 20 capsule; Refill: 0    2. Acute cough  Comments:  patient will notify if desires rx         Future Appointments   Date Time Provider Department Center   9/27/2024  8:20 AM LAB, [facility] LABORATORY DRAW STATION [facility]   10/11/2024  2:00 PM Ryder Rooney APRN [facility]       Follow up for Keep Apt as Scheduled. Call sooner if needed.    LUCAS COOK    Lab Frequency Next Occurrence   CT Chest Lung Screening Low Dose Once 09/28/2023   CBC Auto Differential Once 09/28/2024   Comprehensive Metabolic Panel Once 09/28/2024   Lipid Panel Once 09/28/2024   TSH Once 09/28/2024   Hemoglobin A1C Once 09/28/2024

## 2024-10-09 ENCOUNTER — OFFICE VISIT (OUTPATIENT)
Dept: FAMILY MEDICINE | Facility: CLINIC | Age: 64
End: 2024-10-09
Payer: COMMERCIAL

## 2024-10-09 VITALS
BODY MASS INDEX: 22.82 KG/M2 | HEART RATE: 78 BPM | WEIGHT: 128.81 LBS | DIASTOLIC BLOOD PRESSURE: 66 MMHG | SYSTOLIC BLOOD PRESSURE: 112 MMHG | HEIGHT: 63 IN | OXYGEN SATURATION: 97 % | TEMPERATURE: 98 F

## 2024-10-09 DIAGNOSIS — M54.16 LUMBAR RADICULOPATHY: ICD-10-CM

## 2024-10-09 DIAGNOSIS — Z28.21 INFLUENZA VACCINATION DECLINED BY PATIENT: ICD-10-CM

## 2024-10-09 DIAGNOSIS — Z00.00 WELLNESS EXAMINATION: Primary | ICD-10-CM

## 2024-10-09 DIAGNOSIS — E78.49 OTHER HYPERLIPIDEMIA: ICD-10-CM

## 2024-10-09 DIAGNOSIS — R73.03 PREDIABETES: ICD-10-CM

## 2024-10-09 DIAGNOSIS — Z12.31 SCREENING MAMMOGRAM FOR BREAST CANCER: ICD-10-CM

## 2024-10-09 DIAGNOSIS — Z86.0100 HISTORY OF COLONIC POLYPS: ICD-10-CM

## 2024-10-09 PROBLEM — J06.9 ACUTE UPPER RESPIRATORY INFECTION: Status: RESOLVED | Noted: 2023-09-28 | Resolved: 2024-10-09

## 2024-10-09 PROCEDURE — 3078F DIAST BP <80 MM HG: CPT | Mod: CPTII,,, | Performed by: NURSE PRACTITIONER

## 2024-10-09 PROCEDURE — 3074F SYST BP LT 130 MM HG: CPT | Mod: CPTII,,, | Performed by: NURSE PRACTITIONER

## 2024-10-09 PROCEDURE — 1159F MED LIST DOCD IN RCRD: CPT | Mod: CPTII,,, | Performed by: NURSE PRACTITIONER

## 2024-10-09 PROCEDURE — 3008F BODY MASS INDEX DOCD: CPT | Mod: CPTII,,, | Performed by: NURSE PRACTITIONER

## 2024-10-09 PROCEDURE — 3044F HG A1C LEVEL LT 7.0%: CPT | Mod: CPTII,,, | Performed by: NURSE PRACTITIONER

## 2024-10-09 PROCEDURE — 99396 PREV VISIT EST AGE 40-64: CPT | Mod: ,,, | Performed by: NURSE PRACTITIONER

## 2024-10-09 PROCEDURE — 1160F RVW MEDS BY RX/DR IN RCRD: CPT | Mod: CPTII,,, | Performed by: NURSE PRACTITIONER

## 2024-10-09 RX ORDER — GABAPENTIN 100 MG/1
CAPSULE ORAL
COMMUNITY
Start: 2024-09-23

## 2024-10-09 NOTE — ASSESSMENT & PLAN NOTE
Lipid Panel:  Lab Results   Component Value Date    CHOL 196 09/27/2024    HDL 63 09/27/2024    LDLCALC 118 (H) 09/27/2024    TRIG 84 09/27/2024    AST 16 09/27/2024    ALT 11 09/27/2024    ALKPHOS 70 09/20/2023    ALKPHOSP 78 09/27/2024    LABPROT 6.6 09/20/2023    ALBUMIN 4.6 09/27/2024

## 2024-10-09 NOTE — PROGRESS NOTES
Patient ID: Angelique Maguire  : 1960    Chief Complaint: Annual Exam (wellness)      History of Present Illness:  The patient is a 64 y.o. White female who presents to clinic for annual wellness visit.    Diet and nutrition:  Diet is high in salt, high in fat, low in fiber, high caloric intake, high carbohydrate meals, high calcium intake.    Fracture risk: No history of fracture, no recent unexplained fracture.    Physical activity:  Does not exercise on a regular basis, good physical condition.    Depression risks:  No history of depression, never feel sad, empty, or tearful, no sleep disturbances, no agitation, no loss of energy, no feelings of worthlessness or guilt, no thoughts of suicide.    Orientation:  No disorientation to time, no disorientation to place.    Concentration and memory:  No decreased concentration ability, no memory lapses or loss, does not forget words.    Speech forward/motor difficulties: No speech difficulties, no difficulty expressing formulated concepts, no difficulty with fine manipulative tasks, no difficulty writing forward/copying, no slowed reaction time, does not knock things over when trying to pick them up.    Fall risk assessment:  No frequent falls while walking, no fall in the past year, no dizziness forward/vertigo, no fear of falling.  Hearing:  No loss of hearing, does not wear hearing aids.    Vision:  No vision problems, does wear glasses.    Activity of daily living: Able to bathe with limited or no assistance, able to control urination and bowels, able to dress with limited or no assistance, able to feed self with limited or no assistance, able to get out of chair or bed with limited or no assistance, able to Sylvia with limited or no assistance, able to toilet with limited are no assistance.    Activities of daily living:  Able to do housework with limited or no assistance, able to grocery shop with limited or no assistance, able to manage medications with  limited or no assistance, able to manage money with limited or no assistance, able to prepare meals with limited or no assistance, able to use the phone with limited or no assistance.    Screenings: due for vaccinations, due for breast cancer screening, due for cervical cancer screening- hysterectomy, not due for colorectal cancer screening 11/17/19 colonoscopy- repeat 5 yrs           Allergies: Patient is allergic to adhesive.     Past Medical History:  has no past medical history on file.    Surgical History:  has a past surgical history that includes Colonoscopy (11/07/2019); Hysterectomy; Tubal ligation; Appendectomy; and Eye surgery.    Family History: family history includes Alzheimer's disease in her mother; Asthma in her sister; Cancer in her father and paternal uncle; Heart disease in her maternal uncle; Miscarriages / Stillbirths in her daughter; Stroke in her paternal grandmother.    Social History:  reports that she quit smoking about 6 years ago. Her smoking use included cigarettes. She started smoking about 48 years ago. She has a 42.1 pack-year smoking history. She has never used smokeless tobacco. She reports that she does not drink alcohol and does not use drugs.    Care Team: Patient Care Team:  Ryder Rooney APRN as PCP - General (Family Medicine)  Domi [facility]  Nolan Marie Jr., MD     Current Medications:  Current Outpatient Medications   Medication Instructions    gabapentin (NEURONTIN) 100 MG capsule Take by mouth.    ibuprofen (ADVIL,MOTRIN) 600 mg, Every 8 hours PRN       Opioid Screening: Patient medication list reviewed, patient is not taking prescription opioids. Patient is not using additional opioids than prescribed. Patient is at low risk of substance abuse based on this opioid use history.     Review of Systems   Constitutional:  Negative for appetite change, fatigue, fever and unexpected weight change.   HENT:  Negative for ear pain, facial swelling, hearing loss,  "mouth sores, nosebleeds, sore throat and trouble swallowing.    Eyes:  Negative for pain, discharge, redness and visual disturbance.   Respiratory:  Negative for cough, chest tightness and shortness of breath.    Cardiovascular:  Negative for chest pain, palpitations and leg swelling.   Gastrointestinal:  Negative for abdominal pain, blood in stool, constipation, diarrhea and nausea.   Endocrine: Negative for cold intolerance, heat intolerance, polydipsia, polyphagia and polyuria.   Genitourinary:  Negative for difficulty urinating, dysuria, flank pain, frequency, urgency, vaginal bleeding, vaginal discharge and vaginal pain.   Musculoskeletal:  Positive for arthralgias and back pain. Negative for joint swelling and joint deformity.   Integumentary:  Negative for color change, rash and mole/lesion.   Neurological:  Negative for dizziness, weakness, headaches and memory loss.   Hematological:  Negative for adenopathy. Does not bruise/bleed easily.   Psychiatric/Behavioral:  Negative for confusion, sleep disturbance and suicidal ideas.         Visit Vitals  /66 (BP Location: Right arm, Patient Position: Sitting)   Pulse 78   Temp 98.1 °F (36.7 °C) (Temporal)   Ht 5' 2.99" (1.6 m)   Wt 58.4 kg (128 lb 12.8 oz)   LMP  (LMP Unknown)   SpO2 97%   BMI 22.82 kg/m²       Physical Exam  Vitals reviewed.   Constitutional:       General: She is not in acute distress.     Appearance: Normal appearance.   HENT:      Head: Normocephalic and atraumatic.      Right Ear: Tympanic membrane, ear canal and external ear normal.      Left Ear: Tympanic membrane, ear canal and external ear normal.      Nose: Nose normal. No congestion.      Mouth/Throat:      Mouth: Mucous membranes are moist.      Pharynx: Oropharynx is clear.   Eyes:      Conjunctiva/sclera: Conjunctivae normal.   Cardiovascular:      Rate and Rhythm: Normal rate and regular rhythm.      Pulses: Normal pulses.   Pulmonary:      Effort: Pulmonary effort is normal. "      Breath sounds: Normal breath sounds.   Musculoskeletal:      Cervical back: Neck supple.      Right lower leg: No edema.      Left lower leg: No edema.   Lymphadenopathy:      Cervical: No cervical adenopathy.   Skin:     General: Skin is warm and dry.      Coloration: Skin is not jaundiced.      Findings: No rash.   Neurological:      Mental Status: She is alert and oriented to person, place, and time.      Gait: Gait abnormal (antalgic).   Psychiatric:         Mood and Affect: Mood normal.         Behavior: Behavior normal.               No data to display                  9/28/2023     8:30 AM 4/13/2023    11:00 AM   Fall Risk Assessment - Outpatient   Mobility Status Ambulatory Ambulatory   Number of falls 0 0   Identified as fall risk False False                Labs Reviewed:  Chemistry:  Lab Results   Component Value Date     09/27/2024    K 3.9 09/27/2024    BUN 13 09/27/2024    CREATININE 0.85 09/27/2024    EGFRNORACEVR 77 09/27/2024    GLUCOSE 99 09/20/2023    CALCIUM 9.2 09/27/2024    ALKPHOS 70 09/20/2023    LABPROT 6.6 09/20/2023    ALBUMIN 4.6 09/27/2024    AST 16 09/27/2024    ALT 11 09/27/2024    TSH 1.020 09/27/2024        Lab Results   Component Value Date    HGBA1C 5.5 09/27/2024        Hematology:  Lab Results   Component Value Date    WBC 7.2 09/27/2024    RBC 4.78 09/27/2024    HGB 14.2 09/27/2024    HCT 44.1 09/27/2024    MCV 92 09/27/2024    MCH 29.7 09/27/2024    MCHC 32.2 09/27/2024    RDW 13.2 09/27/2024     09/27/2024    MPV 9.2 (L) 09/20/2023    MONO 9 09/27/2024    MONO 0.7 09/27/2024    BASO 0.1 09/27/2024    EOSINOPHIL 1 09/27/2024    BASOPHIL 1 09/27/2024       Lipid Panel:  Lab Results   Component Value Date    CHOL 196 09/27/2024    HDL 63 09/27/2024    LDLDIRECT 115.8 (H) 09/20/2023    TRIG 84 09/27/2024        Assessment & Plan:  1. Wellness examination  Overview:  Cervical Cancer Screening- hysterectomy  Breast Cancer Screening-10/12/23 mammo, annual mammogram  ordered  Osteoporosis Screening-not due  Colon Cancer Screening -  Colonoscopy 11/17/19 repeat 5 yrs- due patient requests to wait until resolves back issues, will notify when ready for referral to be sent to Dr. Mitchell  Eye Exam- Recommend annually. Established with [facility]  Dental Exam- Recommend biannually.      2. Screening mammogram for breast cancer  -     Mammo Digital Screening Bilat w/ Jose; Future; Expected date: 10/09/2024    3. Other hyperlipidemia  Overview:  Stable with lifestyle modification    The 10-year ASCVD risk score (Kajal NOLAND, et al., 2019) is: 2.3%    Values used to calculate the score:      Age: 64 years      Sex: Female      Is Non- : No      Diabetic: No      Tobacco smoker: No      Systolic Blood Pressure: 100 mmHg      Is BP treated: No      HDL Cholesterol: 63 mg/dL      Total Cholesterol: 196 mg/dL      Assessment & Plan:  Lipid Panel:  Lab Results   Component Value Date    CHOL 196 09/27/2024    HDL 63 09/27/2024    LDLCALC 118 (H) 09/27/2024    TRIG 84 09/27/2024    AST 16 09/27/2024    ALT 11 09/27/2024    ALKPHOS 70 09/20/2023    ALKPHOSP 78 09/27/2024    LABPROT 6.6 09/20/2023    ALBUMIN 4.6 09/27/2024          4. Prediabetes  Overview:  Diagnosed 9/19/22 hb a1c 5.8, stable with lifestyle modifications    Assessment & Plan:  Diabetes labs:   Lab Results   Component Value Date    HGBA1C 5.5 09/27/2024          5. Lumbar radiculopathy  Overview:  Treatment per Dr. Nolan Marie, did 3 steroid injections, surgery pending      6. History of colonic polyps  Overview:  2019 colonoscopy with Dr. Barone, repeat 5 yrs d/t history of polyps. Patient requests to wait until after completes back surgery. Patient will call when ready for referral to Dr. Mitchell      7. Influenza vaccination declined by patient         Vaccinations:  Immunization History   Administered Date(s) Administered    COVID-19, MRNA, LN-S, PF (MODERNA FULL 0.5 ML DOSE) 07/30/2021,  09/04/2021    DTaP [birth year]    Influenza (FLUBLOK) - Quadrivalent - Recombinant - PF *Preferred* (egg allergy) 11/09/2020, 10/03/2022    Influenza - Quadrivalent - PF *Preferred* (6 months and older) 12/14/2019, 09/28/2023    MMR 07/03/2002    OPV [birth year]    Td (ADULT) 07/03/2002    Td - PF (ADULT) 07/03/2002    Tdap 05/12/2015, 09/27/2019       No future appointments.    Follow up for 1 year, Wellness, fasting labs prior. Call sooner if needed.    LUCAS COOK         Medicare Annual Wellness and Personalized Prevention Plan:   Fall Risk + Home Safety + Hearing Impairment + Depression Screen + Opioid and Substance Abuse Screening + Cognitive Impairment Screen + Health Risk Assessment all reviewed.     Health Maintenance Topics with due status: Not Due       Topic Last Completion Date    TETANUS VACCINE 09/27/2019    Hemoglobin A1c (Prediabetes) 09/27/2024    Lipid Panel 09/27/2024    RSV Vaccine (Age 60+ and Pregnant patients) Not Due      The patient's Health Maintenance was reviewed and the following appears to be due at this time:   Health Maintenance Due   Topic Date Due    Hepatitis C Screening  Never done    HIV Screening  Never done    LDCT Lung Screen  Never done    Shingles Vaccine (1 of 2) Never done    Influenza Vaccine (1) 09/01/2024    COVID-19 Vaccine (3 - 2024-25 season) 09/01/2024    Mammogram  10/12/2024    Colorectal Cancer Screening  11/07/2024       A comprehensive HEALTH RISK ASSESSMENT was completed today. Results are summarized below:                  The patient is NOT A TOBACCO USER.  The patient reports NO SIGNIFICANT ALCOHOL USE.     All Questions regarding food, transportation or housing were not answered today.    Provided patient with a 5-10 year written screening schedule and personal prevention plan. Recommendations were developed using the USPSTF age appropriate recommendations. Education, counseling, and referrals were provided as  needed. After Visit Summary printed and given to patient, which includes a list of additional screenings\tests needed.    Advance Care Planning   I attest to discussing Advance Care Planning with patient and/or family member.  Education was provided including the importance of the Health Care Power of , Advance Directives, and/or [place] documentation.  The patient expressed understanding to the importance of this information and discussion.       Follow up for 1 year, Wellness, fasting labs prior. In addition to their scheduled follow up, the patient has also been instructed to follow up on as needed basis.       Lab Frequency Next Occurrence   Mammo Digital Screening Bilat w/ Jose Once 10/09/2024   CBC Auto Differential Once 10/09/2025   Comprehensive Metabolic Panel Once 10/09/2025   Lipid Panel Once 10/09/2025   TSH Once 10/09/2025   Hemoglobin A1C Once 10/09/2025

## 2024-10-24 ENCOUNTER — HOSPITAL ENCOUNTER (OUTPATIENT)
Dept: RADIOLOGY | Facility: HOSPITAL | Age: 64
Discharge: HOME | End: 2024-10-24
Attending: NURSE PRACTITIONER
Payer: COMMERCIAL

## 2024-10-24 DIAGNOSIS — Z12.31 SCREENING MAMMOGRAM FOR BREAST CANCER: ICD-10-CM

## 2024-10-24 PROCEDURE — 77063 BREAST TOMOSYNTHESIS BI: CPT | Mod: TC

## 2024-10-24 PROCEDURE — 77067 SCR MAMMO BI INCL CAD: CPT | Mod: TC

## 2024-10-28 ENCOUNTER — PATIENT MESSAGE (OUTPATIENT)
Dept: FAMILY MEDICINE | Facility: CLINIC | Age: 64
End: 2024-10-28
Payer: COMMERCIAL

## 2024-12-10 ENCOUNTER — OFFICE VISIT (OUTPATIENT)
Dept: FAMILY MEDICINE | Facility: CLINIC | Age: 64
End: 2024-12-10
Payer: COMMERCIAL

## 2024-12-10 VITALS
BODY MASS INDEX: 22.89 KG/M2 | TEMPERATURE: 98 F | OXYGEN SATURATION: 96 % | WEIGHT: 129.19 LBS | HEART RATE: 87 BPM | SYSTOLIC BLOOD PRESSURE: 118 MMHG | HEIGHT: 63 IN | DIASTOLIC BLOOD PRESSURE: 78 MMHG

## 2024-12-10 DIAGNOSIS — M54.16 LUMBAR RADICULOPATHY: Primary | ICD-10-CM

## 2024-12-10 PROCEDURE — 1159F MED LIST DOCD IN RCRD: CPT | Mod: CPTII,,, | Performed by: NURSE PRACTITIONER

## 2024-12-10 PROCEDURE — 3078F DIAST BP <80 MM HG: CPT | Mod: CPTII,,, | Performed by: NURSE PRACTITIONER

## 2024-12-10 PROCEDURE — 1160F RVW MEDS BY RX/DR IN RCRD: CPT | Mod: CPTII,,, | Performed by: NURSE PRACTITIONER

## 2024-12-10 PROCEDURE — 3074F SYST BP LT 130 MM HG: CPT | Mod: CPTII,,, | Performed by: NURSE PRACTITIONER

## 2024-12-10 PROCEDURE — 3044F HG A1C LEVEL LT 7.0%: CPT | Mod: CPTII,,, | Performed by: NURSE PRACTITIONER

## 2024-12-10 PROCEDURE — 99213 OFFICE O/P EST LOW 20 MIN: CPT | Mod: ,,, | Performed by: NURSE PRACTITIONER

## 2024-12-10 PROCEDURE — 3008F BODY MASS INDEX DOCD: CPT | Mod: CPTII,,, | Performed by: NURSE PRACTITIONER

## 2024-12-10 NOTE — LETTER
December 10, 2024    Angelique Maguire  [address] [place] [address]             [facility]  [address], [place] [address]  Phone: [phone]  Fax: [phone] To Whom it May Concern:      Patient is able to work with light duty: Lifting 20 lbs maximum with lifting and/or carrying of objects weighing up to 10lbs. Even though the weight may be negotiable amount, a job is in this category when it involves sitting most of the time with a degree of pushing/pulling of arm and/or leg controls. Patient will need frequent breaks to alternate between standing, sitting and walking as her symptoms dictate.         Thank you for your time and consideration,           ROSE Rubalcava

## 2024-12-10 NOTE — PROGRESS NOTES
Patient ID: Angelique Maguire  : 1960     Chief Complaint: Back Injury    Allergies: Patient is allergic to adhesive.     History of Present Illness:  The patient is a 64 y.o. White female who presents to clinic for evaluation and management with a chief complaint of Back Injury   Patient was under the care of neurosurgery for her  back.  She was expecting to have back surgery, which workmans comp refused to cover. After settling with [employer] comp, the neurosurgereon does not take her health insurance plan so she will need a new referral. In the meantime, she is requesting to continue light duty restrictions at work. Denies any worsening symptoms.          Past Medical History:  has no past medical history on file.    Social History:  reports that she quit smoking about 6 years ago. Her smoking use included cigarettes. She started smoking about 48 years ago. She has a 42.1 pack-year smoking history. She has never used smokeless tobacco. She reports that she does not drink alcohol and does not use drugs.    Care Team: Patient Care Team:  Ryder Rooney APRN as PCP - General (Family Medicine)  [facility]  Nolan Marie Jr., MD     Current Medications:  Current Outpatient Medications   Medication Instructions    gabapentin (NEURONTIN) 100 MG capsule Take by mouth.    ibuprofen (ADVIL,MOTRIN) 600 mg, Every 8 hours PRN       Review of Systems   Constitutional:  Negative for appetite change, fatigue, fever and unexpected weight change.   HENT:  Negative for ear pain, facial swelling, hearing loss, mouth sores, nosebleeds, sore throat and trouble swallowing.    Eyes:  Negative for pain, discharge, redness and visual disturbance.   Respiratory:  Negative for cough, chest tightness and shortness of breath.    Cardiovascular:  Negative for chest pain, palpitations and leg swelling.   Gastrointestinal:  Negative for abdominal pain, blood in stool, constipation, diarrhea and nausea.   Endocrine:  "Negative for cold intolerance, heat intolerance, polydipsia, polyphagia and polyuria.   Genitourinary:  Negative for difficulty urinating, dysuria, flank pain, frequency, urgency, vaginal bleeding, vaginal discharge and vaginal pain.   Musculoskeletal:  Positive for arthralgias and back pain. Negative for joint swelling and joint deformity.   Integumentary:  Negative for color change, rash and mole/lesion.   Neurological:  Negative for dizziness, weakness, headaches and memory loss.   Hematological:  Negative for adenopathy. Does not bruise/bleed easily.   Psychiatric/Behavioral:  Negative for confusion, sleep disturbance and suicidal ideas.         Visit Vitals  /78 (BP Location: Left arm, Patient Position: Sitting)   Pulse 87   Temp 97.5 °F (36.4 °C) (Temporal)   Ht 5' 2.99" (1.6 m)   Wt 58.6 kg (129 lb 3.2 oz)   LMP  (LMP Unknown)   SpO2 96%   BMI 22.89 kg/m²       Physical Exam  Vitals reviewed.   Constitutional:       General: She is not in acute distress.     Appearance: Normal appearance.   HENT:      Head: Normocephalic and atraumatic.      Right Ear: Tympanic membrane, ear canal and external ear normal.      Left Ear: Tympanic membrane, ear canal and external ear normal.      Nose: Nose normal. No congestion.      Mouth/Throat:      Mouth: Mucous membranes are moist.      Pharynx: Oropharynx is clear.   Eyes:      Conjunctiva/sclera: Conjunctivae normal.   Cardiovascular:      Rate and Rhythm: Normal rate and regular rhythm.      Pulses: Normal pulses.   Pulmonary:      Effort: Pulmonary effort is normal.      Breath sounds: Normal breath sounds.   Musculoskeletal:      Cervical back: Neck supple.      Right lower leg: No edema.      Left lower leg: No edema.   Lymphadenopathy:      Cervical: No cervical adenopathy.   Neurological:      Mental Status: She is alert and oriented to person, place, and time.      Gait: Gait abnormal (antalgic).   Psychiatric:         Mood and Affect: Mood normal.         " Behavior: Behavior normal.          Labs Reviewed:  Chemistry:  Lab Results   Component Value Date     09/27/2024    K 3.9 09/27/2024    BUN 13 09/27/2024    CREATININE 0.85 09/27/2024    EGFRNORACEVR 77 09/27/2024    GLUCOSE 99 09/20/2023    CALCIUM 9.2 09/27/2024    ALKPHOS 70 09/20/2023    LABPROT 6.6 09/20/2023    ALBUMIN 4.6 09/27/2024    AST 16 09/27/2024    ALT 11 09/27/2024    TSH 1.020 09/27/2024        Lab Results   Component Value Date    HGBA1C 5.5 09/27/2024        Hematology:  Lab Results   Component Value Date    WBC 7.2 09/27/2024    RBC 4.78 09/27/2024    HGB 14.2 09/27/2024    HCT 44.1 09/27/2024    MCV 92 09/27/2024    MCH 29.7 09/27/2024    MCHC 32.2 09/27/2024    RDW 13.2 09/27/2024     09/27/2024    MPV 9.2 (L) 09/20/2023    MONO 9 09/27/2024    MONO 0.7 09/27/2024    BASO 0.1 09/27/2024    EOSINOPHIL 1 09/27/2024    BASOPHIL 1 09/27/2024       Lipid Panel:  Lab Results   Component Value Date    CHOL 196 09/27/2024    HDL 63 09/27/2024    LDLCALC 118 (H) 09/27/2024    LDLDIRECT 115.8 (H) 09/20/2023    TRIG 84 09/27/2024        Assessment & Plan:  1. Lumbar radiculopathy  Overview:  Treatment per Dr. Nolan Marie, did 3 steroid injections, was seeing Dr. Jose Luis Valdez, neurosurgeon. Workman's comp refused surgery, and his office is not in network with her medical insurance.  Request medication records/imaging and will refer. Paperwork completed to continue light duty at work.            Future Appointments   Date Time Provider Department Center   10/14/2025 10:30 AM Ryder Rooney APRN [facility]       Follow up for request all imaging and last visits from Dr. Jose Luis Valdez. Call sooner if needed.    LUCAS COOK        Lab Frequency Next Occurrence   CBC Auto Differential Once 10/09/2025   Comprehensive Metabolic Panel Once 10/09/2025   Lipid Panel Once 10/09/2025   TSH Once 10/09/2025   Hemoglobin A1C Once 10/09/2025

## 2025-02-21 ENCOUNTER — OFFICE VISIT (OUTPATIENT)
Dept: FAMILY MEDICINE | Facility: CLINIC | Age: 65
End: 2025-02-21
Payer: COMMERCIAL

## 2025-02-21 ENCOUNTER — TELEPHONE (OUTPATIENT)
Dept: FAMILY MEDICINE | Facility: CLINIC | Age: 65
End: 2025-02-21

## 2025-02-21 VITALS
OXYGEN SATURATION: 98 % | TEMPERATURE: 98 F | BODY MASS INDEX: 24.13 KG/M2 | HEIGHT: 63 IN | DIASTOLIC BLOOD PRESSURE: 68 MMHG | HEART RATE: 83 BPM | WEIGHT: 136.19 LBS | SYSTOLIC BLOOD PRESSURE: 128 MMHG

## 2025-02-21 DIAGNOSIS — M54.16 LUMBAR RADICULOPATHY: ICD-10-CM

## 2025-02-21 DIAGNOSIS — H01.002 BLEPHARITIS OF RIGHT LOWER EYELID, UNSPECIFIED TYPE: Primary | ICD-10-CM

## 2025-02-21 RX ORDER — NAPROXEN 500 MG/1
500 TABLET ORAL 2 TIMES DAILY PRN
Qty: 60 TABLET | Refills: 0 | Status: SHIPPED | OUTPATIENT
Start: 2025-02-21

## 2025-02-21 RX ORDER — PREDNISONE 20 MG/1
20 TABLET ORAL DAILY
Qty: 5 TABLET | Refills: 0 | Status: SHIPPED | OUTPATIENT
Start: 2025-02-21 | End: 2025-02-26

## 2025-02-21 RX ORDER — ERYTHROMYCIN 5 MG/G
OINTMENT OPHTHALMIC EVERY 6 HOURS
Qty: 3.5 G | Refills: 0 | Status: SHIPPED | OUTPATIENT
Start: 2025-02-21 | End: 2025-02-26

## 2025-02-21 NOTE — PROGRESS NOTES
Patient ID: Angelique Maguire  : 1960    Chief Complaint: Belepharitis    Allergies: Patient is allergic to adhesive.     Subjective :  The patient is a 64 y.o. White female who presents to clinic for follow up on Belepharitis     History of Present Illness    HPI:  Patient reports eye blurriness onset 2-3 days ago, followed by significant redness the next day. She applied hydration drops, which substantially reduced the redness. Eye swelling persists. She has intraocular lenses from previous cataract surgery. She noticed a small bump on the lower eyelid a few nights ago. She denies any contraindications for antibiotic eye drops.    Patient reports exacerbation of ongoing back and leg issues due to cold weather. When sitting and turning sideways, she has back spasms. Rapid movements cause bilateral leg numbness, requiring her to remain stationary until the sensation subsides. She has received 3 steroid injections previously. The first injection alleviated spasms and leg tingling. After re-injuring her back, all symptoms returned except spasms, which have recently recurred. These back issues originated 2 years ago on the th of a month following an injury.    She denies wearing makeup or having diabetes.    MEDICATIONS:  Patient is on Gabapentin. She is also using hydration eye drops and cysteine eye drops for dry eyes.    MEDICAL HISTORY:  Patient has a history of cataracts, back injury, and blepharitis or eye infection.    FAMILY HISTORY:  Family history is significant for her son who had back surgery 3 weeks ago for a spur on his spine pressing against his side.    SURGICAL HISTORY:  She has undergone cataract surgery.      ROS:  ROS as indicated in HPI.           Social History:  reports that she quit smoking about 7 years ago. Her smoking use included cigarettes. She started smoking about 49 years ago. She has a 42.1 pack-year smoking history. She has never used smokeless tobacco. She reports that she  "does not drink alcohol and does not use drugs.    Past Medical History:  has no past medical history on file.    Care Team: Patient Care Team:  Ryder Rooney APRN as PCP - General (Family Medicine)  [facility]  Nolan Marie Jr., MD     Current Medications:  Current Outpatient Medications   Medication Instructions    erythromycin (ROMYCIN) ophthalmic ointment Left Eye, Every 6 hours, 1/2 inch in affected eye(s) q6h for 5 days    gabapentin (NEURONTIN) 100 MG capsule Take by mouth.    naproxen (NAPROSYN) 500 mg, Oral, 2 times daily PRN    predniSONE (DELTASONE) 20 mg, Oral, Daily         Visit Vitals  /68 (BP Location: Right arm, Patient Position: Sitting)   Pulse 83   Temp 97.5 °F (36.4 °C) (Temporal)   Ht 5' 2.99" (1.6 m)   Wt 61.8 kg (136 lb 3.2 oz)   LMP  (LMP Unknown)   SpO2 98%   BMI 24.13 kg/m²       Physical Exam  Vitals reviewed.   Constitutional:       General: She is not in acute distress.     Appearance: Normal appearance.   HENT:      Head: Normocephalic and atraumatic.      Nose: Nose normal. No congestion.      Mouth/Throat:      Mouth: Mucous membranes are moist.      Pharynx: Oropharynx is clear.   Eyes:      Conjunctiva/sclera: Conjunctivae normal.      Comments: Inflammation and pimple like lesion to right lower medial lash line. No drainage.    Cardiovascular:      Rate and Rhythm: Normal rate and regular rhythm.      Pulses: Normal pulses.   Pulmonary:      Effort: Pulmonary effort is normal.      Breath sounds: Normal breath sounds.   Musculoskeletal:         General: Tenderness (lumbar spine) present.      Cervical back: Neck supple.      Right lower leg: No edema.      Left lower leg: No edema.   Lymphadenopathy:      Cervical: No cervical adenopathy.   Neurological:      Mental Status: She is alert and oriented to person, place, and time.      Gait: Gait abnormal (antalgic).   Psychiatric:         Mood and Affect: Mood normal.         Behavior: Behavior normal.      "     Labs Reviewed:  Chemistry:  Lab Results   Component Value Date     09/27/2024    K 3.9 09/27/2024    BUN 13 09/27/2024    CREATININE 0.85 09/27/2024    EGFRNORACEVR 77 09/27/2024    GLUCOSE 99 09/20/2023    CALCIUM 9.2 09/27/2024    ALKPHOS 70 09/20/2023    LABPROT 6.6 09/20/2023    ALBUMIN 4.6 09/27/2024    AST 16 09/27/2024    ALT 11 09/27/2024    TSH 1.020 09/27/2024        Lab Results   Component Value Date    HGBA1C 5.5 09/27/2024        Hematology:  Lab Results   Component Value Date    WBC 7.2 09/27/2024    RBC 4.78 09/27/2024    HGB 14.2 09/27/2024    HCT 44.1 09/27/2024    MCV 92 09/27/2024    MCH 29.7 09/27/2024    MCHC 32.2 09/27/2024    RDW 13.2 09/27/2024     09/27/2024    MPV 9.2 (L) 09/20/2023    MONO 9 09/27/2024    MONO 0.7 09/27/2024    BASO 0.1 09/27/2024    EOSINOPHIL 1 09/27/2024    BASOPHIL 1 09/27/2024       Lipid Panel:  Lab Results   Component Value Date    CHOL 196 09/27/2024    HDL 63 09/27/2024    LDLCALC 118 (H) 09/27/2024    LDLDIRECT 115.8 (H) 09/20/2023    TRIG 84 09/27/2024        Diagnosis:  1. Blepharitis of right lower eyelid, unspecified type  -     erythromycin (ROMYCIN) ophthalmic ointment; Place into the left eye every 6 (six) hours. 1/2 inch in affected eye(s) q6h for 5 days for 5 days  Dispense: 3.5 g; Refill: 0    2. Lumbar radiculopathy  Overview:  Treatment per Dr. Nolan Marie, did 3 steroid injections, was seeing Dr. Jose Luis Valdez, neurosurgeon. Workman's comp refused surgery, and his office is not in network with her medical insurance.  Request medication records/imaging and will refer. Paperwork completed to continue light duty at work.     Orders:  -     predniSONE (DELTASONE) 20 MG tablet; Take 1 tablet (20 mg total) by mouth once daily. for 5 days  Dispense: 5 tablet; Refill: 0  -     naproxen (NAPROSYN) 500 MG tablet; Take 1 tablet (500 mg total) by mouth 2 (two) times daily as needed (pain/inflammation).  Dispense: 60 tablet; Refill:  0         Assessment & Plan    H01.002 Blepharitis of right lower eyelid, unspecified type  M54.16 Lumbar radiculopathy    IMPRESSION:  - Diagnosed blepharitis or minor infection in eyelash follicles  - Prescribed antibiotic eye ointment for eyelid infection  - Considered short course of oral steroids to reduce inflammation for back pain  - Recommend muscle relaxant for back spasms, to be taken at night    H01.002 BLEPHARITIS OF RIGHT LOWER EYELID, UNSPECIFIED TYPE:  - Prescribed antibiotic eye ointment to be applied to affected eye every 6 hours while awake.  - Educated the patient about potential temporary blurred vision from the ointment.  - Instructed to apply warm moist compresses to affected eye for 5-10 minutes before applying ointment.  - Advised to discard any eye makeup or products that have touched the infected eye.  - Directed the patient to contact the office if unable to obtain the prescribed eye ointment.    M54.16 LUMBAR RADICULOPATHY:  - refill naproxen per patient request. Advised to take only one nsaid at a time. Patient states understanding.   - Instructed the patient to contact insurance company to find in-network orthopedic surgeons.         Future Appointments   Date Time Provider Department Center   2/21/2025  3:00 PM Ryder Rooney APRN [facility]   10/14/2025 10:30 AM Ryder Rooney APRN [facility]       Follow up if symptoms worsen or fail to improve, for Keep Apt as Scheduled. Call sooner if needed.    LUCAS COOK    Lab Frequency Next Occurrence   CBC Auto Differential Once 10/09/2025   Comprehensive Metabolic Panel Once 10/09/2025   Lipid Panel Once 10/09/2025   TSH Once 10/09/2025   Hemoglobin A1C Once 10/09/2025            This note was generated with the assistance of ambient listening technology. Verbal consent was obtained by the patient and accompanying visitor(s) for the recording of patient appointment to facilitate this note. I attest to having  reviewed and edited the generated note for accuracy, though some syntax or spelling errors may persist. Please contact the author of this note for any clarification.

## 2025-02-21 NOTE — TELEPHONE ENCOUNTER
Patient is aware medication was sent to her pharmacy she is aware not to take ibuprofen and naproxen at the same time because they are both antiinflammatories.

## 2025-03-18 ENCOUNTER — TELEPHONE (OUTPATIENT)
Dept: FAMILY MEDICINE | Facility: CLINIC | Age: 65
End: 2025-03-18
Payer: COMMERCIAL

## 2025-03-18 DIAGNOSIS — M54.16 LUMBAR RADICULOPATHY: Primary | ICD-10-CM

## 2025-03-18 NOTE — TELEPHONE ENCOUNTER
I have ordered referral.    Orders Placed This Encounter   Procedures    Ambulatory referral/consult to Orthopedics     Standing Status:   Future     Expected Date:   3/25/2025     Expiration Date:   4/18/2026     Referral Priority:   Routine     Referral Type:   Consultation     Referred to Provider:   Félix Rasheed MD     Requested Specialty:   Orthopedic Surgery     Number of Visits Requested:   1

## 2025-04-04 DIAGNOSIS — M54.16 LUMBAR RADICULOPATHY: ICD-10-CM

## 2025-04-04 RX ORDER — NAPROXEN 500 MG/1
500 TABLET ORAL 2 TIMES DAILY PRN
Qty: 60 TABLET | Refills: 2 | Status: SHIPPED | OUTPATIENT
Start: 2025-04-04

## 2025-07-09 ENCOUNTER — TELEPHONE (OUTPATIENT)
Dept: ORTHOPEDICS | Facility: CLINIC | Age: 65
End: 2025-07-09
Payer: COMMERCIAL

## 2025-07-09 DIAGNOSIS — M54.50 LOW BACK PAIN, UNSPECIFIED BACK PAIN LATERALITY, UNSPECIFIED CHRONICITY, UNSPECIFIED WHETHER SCIATICA PRESENT: Primary | ICD-10-CM

## 2025-07-14 ENCOUNTER — OFFICE VISIT (OUTPATIENT)
Dept: ORTHOPEDIC SURGERY | Facility: CLINIC | Age: 65
End: 2025-07-14
Payer: COMMERCIAL

## 2025-07-14 ENCOUNTER — HOSPITAL ENCOUNTER (OUTPATIENT)
Dept: RADIOLOGY | Facility: CLINIC | Age: 65
Discharge: HOME | End: 2025-07-14
Attending: ORTHOPAEDIC SURGERY
Payer: COMMERCIAL

## 2025-07-14 DIAGNOSIS — M54.16 LUMBAR RADICULOPATHY: ICD-10-CM

## 2025-07-14 DIAGNOSIS — M54.9 DORSALGIA, UNSPECIFIED: ICD-10-CM

## 2025-07-14 DIAGNOSIS — M54.50 LOW BACK PAIN, UNSPECIFIED BACK PAIN LATERALITY, UNSPECIFIED CHRONICITY, UNSPECIFIED WHETHER SCIATICA PRESENT: ICD-10-CM

## 2025-07-14 DIAGNOSIS — M51.362 DEGENERATION OF INTERVERTEBRAL DISC OF LUMBAR REGION WITH DISCOGENIC BACK PAIN AND LOWER EXTREMITY PAIN: ICD-10-CM

## 2025-07-14 DIAGNOSIS — M47.816 LUMBAR SPONDYLOSIS: Primary | ICD-10-CM

## 2025-07-14 PROCEDURE — 1160F RVW MEDS BY RX/DR IN RCRD: CPT | Mod: CPTII,S$GLB,, | Performed by: ORTHOPAEDIC SURGERY

## 2025-07-14 PROCEDURE — 99204 OFFICE O/P NEW MOD 45 MIN: CPT | Mod: S$GLB,,, | Performed by: ORTHOPAEDIC SURGERY

## 2025-07-14 PROCEDURE — 72110 X-RAY EXAM L-2 SPINE 4/>VWS: CPT | Mod: ,,, | Performed by: ORTHOPAEDIC SURGERY

## 2025-07-14 PROCEDURE — 1159F MED LIST DOCD IN RCRD: CPT | Mod: CPTII,S$GLB,, | Performed by: ORTHOPAEDIC SURGERY

## 2025-07-14 NOTE — PROGRESS NOTES
The patient location is: [place]  The chief complaint leading to consultation is: MRI results    Visit type: audiovisual    Face to Face time with patient: 10 min  15 minutes of total time spent on the encounter, which includes face to face time and non-face to face time preparing to see the patient (eg, review of tests), Obtaining and/or reviewing separately obtained history, Documenting clinical information in the electronic or other health record, Independently interpreting results (not separately reported) and communicating results to the patient/family/caregiver, or Care coordination (not separately reported).         Each patient to whom he or she provides medical services by telemedicine is:  (1) informed of the relationship between the physician and patient and the respective role of any other health care provider with respect to management of the patient; and (2) notified that he or she may decline to receive medical services by telemedicine and may withdraw from such care at any time.    Notes:     DATE: 8/4/2025  PATIENT: Angelique Maguire    Attending Physician: Herbie Sen M.D.    HISTORY:  Angelique Maguire is a 64 y.o. female who returns to me today for MRI results.  She was last seen by Dr. Sen on 7/14/25.  Today she is doing well but notes she continues to have low back and b/l leg pain (Back - 2, Leg - 2). The pain has been present for 2.5 years. The patient describes the pain as dull and it radiates down BLE to the feet.  The pain is worse with activity and improved by rest. There is BLE associated numbness and tingling. There is BLE subjective weakness. Prior treatments have included OTC meds, PT, SHREYA, but no surgery.     The Patient denies myelopathic symptoms such as handwriting changes or difficulty with buttons/coins/keys. Denies perineal paresthesias, bowel/bladder dysfunction.     The patient does not smoke (quit 7 years ago), have DM or endorse IVDU. The patient is not on any blood  thinners and does not take chronic narcotics. She works at [employer]; she is from [place], [place].      EXAM:  LMP  (LMP Unknown)     My physical examination was notable for the following findings:     Musculoskeletal and neuro exam stable      IMAGING:    Today I personally re- reviewed AP, Lat and Flex/Ex  upright L-spine that demonstrate spondylosis and DDD w/o listhesis.      MRI lumbar demonstrates mild L4-5 disc bulge     There is no height or weight on file to calculate BMI.    Hemoglobin A1c   Date Value Ref Range Status   09/27/2024 5.5 4.8 - 5.6 % Final     Comment:              Prediabetes: 5.7 - 6.4           Diabetes: >6.4           Glycemic control for adults with diabetes: <7.0     09/20/2023 5.7 4.0 - 6.0 % Final         ASSESSMENT/PLAN:    Diagnoses and all orders for this visit:    Low back pain, unspecified back pain laterality, unspecified chronicity, unspecified whether sciatica present  -     EMG W/ ULTRASOUND AND NERVE CONDUCTION TEST 2 Extremities; Future  -     EMG W/ ULTRASOUND AND NERVE CONDUCTION TEST 2 Extremities        Today we discussed at length all of the different treatment options including anti-inflammatories, acetaminophen, rest, ice, heat, physical therapy including strengthening and stretching exercises, home exercises, ROM, aerobic conditioning, aqua therapy, other modalities including ultrasound, massage, and dry needling, epidural steroid injections and finally surgical intervention.      Pt presents with chronic low back and bilateral leg pain. Failure of conservative rx. Will fax referral for EMG BLE to [facility] in [place]

## 2025-07-14 NOTE — PROGRESS NOTES
DATE: 7/14/2025  PATIENT: Angelique Maguire    Attending Physician: Herbie Sen M.D.    CHIEF COMPLAINT: LBP and BLE pain    HISTORY:  Angelique Maguire is a 64 y.o. female presents for initial evaluation of low back and b/l leg pain (Back - 2, Leg - 2). The pain has been present for 2.5 years. The patient describes the pain as dull and it radiates down BLE to the feet.  The pain is worse with activity and improved by rest. There is BLE associated numbness and tingling. There is BLE subjective weakness. Prior treatments have included OTC meds, PT, SHREYA, but no surgery.    The Patient denies myelopathic symptoms such as handwriting changes or difficulty with buttons/coins/keys. Denies perineal paresthesias, bowel/bladder dysfunction.    The patient does not smoke (quit 7 years ago), have DM or endorse IVDU. The patient is not on any blood thinners and does not take chronic narcotics. She works at [employer]; she is from [place], [place].    PAST MEDICAL/SURGICAL HISTORY:  History reviewed. No pertinent past medical history.  Past Surgical History:   Procedure Laterality Date    APPENDECTOMY      Took my appendix when he did my hysterectomy    COLONOSCOPY  11/07/2019    EYE SURGERY      Cataract surgery    HYSTERECTOMY      TUBAL LIGATION         Current Medications: Current Medications[1]    Social History: Social History[2]    REVIEW OF SYSTEMS:  Constitution: Negative. Negative for chills, fever and night sweats.   Cardiovascular: Negative for chest pain and syncope.   Respiratory: Negative for cough and shortness of breath.   Gastrointestinal: See HPI. Negative for nausea/vomiting. Negative for abdominal pain.  Genitourinary: See HPI. Negative for discoloration or dysuria.  Hematologic/Lymphatic: negative for bleeding/clotting disorders.   Musculoskeletal: Negative for falls and muscle weakness.   Neurological: See HPI. no history of seizures. no history of cranial surgery or shunts.  Neurological: See HPI. No  "seizures.   Endocrine: Negative for polydipsia, polyphagia and polyuria.   Allergic/Immunologic: Negative for hives and persistent infections.     EXAM:  LMP  (LMP Unknown)     PHYSICAL EXAMINATION:    General: The patient is a 64 y.o. female in no apparent distress, the patient is orientatied to person, place and time.  Psych: Normal mood and affect  HEENT: Vision grossly intact, hearing intact to the spoken word.  Lungs: Respirations unlabored.  Gait: Normal station and gait, no difficulty with toe or heel walk.   Skin: Dorsal lumbar skin negative for rashes, lesions, hairy patches and surgical scars. There is lower lumbar tenderness to palpation.  Range of motion: Lumbar range of motion is acceptable.  Spinal Balance: Global saggital and coronal spinal balance acceptable, no significant for scoliosis and kyphosis.  Musculoskeletal: No pain with the range of motion of the bilateral hips. No trochanteric tenderness to palpation.  Vascular: Bilateral lower extremities warm and well perfused, Dorsalis pedis pulses 2+ bilaterally.  Neurological: Normal strength and tone in all major motor groups in the bilateral lower extremities. Normal sensation to light touch in the L2-S1 dermatomes bilaterally.  Deep tendon reflexes symmetric 2+ in the bilateral lower extremities.  Negative Babinski bilaterally. Straight leg raise negative bilaterally.    IMAGING:   Today I independently reviewed the following images and my interpretations are as follows:    AP, Lat and Flex/Ex  upright L-spine demonstrate spondylosis and DDD w/o listhesis.      There is no height or weight on file to calculate BMI.  Hemoglobin A1c   Date Value Ref Range Status   09/27/2024 5.5 4.8 - 5.6 % Final     Comment:              Prediabetes: 5.7 - 6.4           Diabetes: >6.4           Glycemic control for adults with diabetes: <7.0     09/20/2023 5.7 4.0 - 6.0 % Final       ASSESSMENT/PLAN:    Angelique Alvarado" was seen today for low-back " [facility] - S Jluis / Shabbir S /D Romina/ SHERRI Mcfarlane/ SHERRI Madden/ Andrés Perkins / M Tristianu/ O Gladis  service -[phone], office [phone]  ---------------------------------------------------------------------------------------------------------------    Patient seen and examined bedside    Subjective and Objective: No overnight events, sob resolved. No complaints today. feeling better    Allergies: No Known Allergies      Hospital Medications:   MEDICATIONS  (STANDING):  aspirin  chewable 81 milliGRAM(s) Oral daily  atorvastatin 40 milliGRAM(s) Oral at bedtime  cadexomer iodine 0.9% Gel 1 Application(s) Topical daily  chlorhexidine 2% Cloths 1 Application(s) Topical daily  clopidogrel Tablet 75 milliGRAM(s) Oral daily  Dakins Solution - 1/2 Strength 1 Application(s) Topical daily  epoetin niesha-epbx (RETACRIT) Injectable 18133 Unit(s) IV Push <User Schedule>  heparin   Injectable 5000 Unit(s) SubCutaneous every 12 hours  multivitamin 1 Tablet(s) Oral daily  sevelamer carbonate 800 milliGRAM(s) Oral three times a day with meals      REVIEW OF SYSTEMS:  CONSTITUTIONAL: No weakness, fevers or chills  EYES/ENT: No visual changes;  No vertigo or throat pain   NECK: No pain or stiffness  RESPIRATORY: No cough, wheezing, hemoptysis; No shortness of breath  CARDIOVASCULAR: No chest pain or palpitations.  GASTROINTESTINAL: No abdominal or epigastric pain. No nausea, vomiting, or hematemesis; No diarrhea or constipation. No melena or hematochezia.  GENITOURINARY: No dysuria, frequency, foamy urine, urinary urgency, incontinence or hematuria  NEUROLOGICAL: No numbness or weakness  SKIN: No itching, burning, rashes, or lesions   VASCULAR: No bilateral lower extremity edema.   All other review of systems is negative unless indicated above.    VITALS:  T(F): 98.4 (05-09-22 @ 05:03), Max: 98.4 (05-09-22 @ 05:03)  HR: 69 (05-09-22 @ 05:03)  BP: 136/52 (05-09-22 @ 05:03)  RR: 17 (05-09-22 @ 05:03)  SpO2: 99% (05-09-22 @ 05:03)  Wt(kg): --        PHYSICAL EXAM:  Constitutional: NAD  HEENT: anicteric sclera, oropharynx clear  Neck: No JVD  Respiratory: CTAB, no wheezes, rales or rhonchi  Cardiovascular: S1, S2, RRR  Gastrointestinal: BS+, soft, NT/ND  Extremities: No cyanosis or clubbing. No peripheral edema  Neurological: A/O x 3, no focal deficits  Psychiatric: Normal mood, normal affect  : No CVA tenderness. No salcido.   Skin: No rashes  Vascular Access:    LABS:  05-09    137  |  93<L>  |  65<H>  ----------------------------<  95  4.4   |  24  |  8.30<H>    Ca    10.0      09 May 2022 05:51  Phos  6.3     05-09  Mg     2.60     05-09      Creatinine Trend: 8.30 <--, 6.14 <--, 8.22 <--, 6.01 <--, 8.08 <--, 6.08 <--, 7.97 <--                        11.0   8.26  )-----------( 320      ( 09 May 2022 05:53 )             36.2     Urine Studies:        RADIOLOGY & ADDITIONAL STUDIES:   pain.    Diagnoses and all orders for this visit:    Lumbar spondylosis    Lumbar radiculopathy    Degeneration of intervertebral disc of lumbar region with discogenic back pain and lower extremity pain    Dorsalgia, unspecified  -     MRI Lumbar Spine Without Contrast; Future      Follow up in about 4 weeks (around 2025).    Patient has lumbar spondylosis and BLE radiculopathy. I discussed the natural history of their diagnoses as well as surgical and nonsurgical treatment options. I educated the patient on the importance of core/back strengthening, correct posture, bending/lifting ergonomics, and low-impact aerobic exercises (walking, elliptical, and aquatherapy). Continue medications. I ordered lumbar MRI to evaluate stenosis. Patient will follow up in 4 weeks for MRI review.    Herbie Sen MD  Orthopaedic Spine Surgeon  Department of Orthopaedic Surgery  [phone]           [1]   Current Outpatient Medications:     gabapentin (NEURONTIN) 100 MG capsule, Take by mouth. (Patient not taking: Reported on 2025), Disp: , Rfl:     naproxen (NAPROSYN) 500 MG tablet, Take 1 tablet (500 mg total) by mouth 2 (two) times daily as needed (pain/inflammation). (Patient not taking: Reported on 2025), Disp: 60 tablet, Rfl: 2  [2]   Social History  Socioeconomic History    Marital status:    Tobacco Use    Smoking status: Former     Current packs/day: 0.00     Average packs/day: 1 pack/day for 42.1 years (42.1 ttl pk-yrs)     Types: Cigarettes     Start date:      Quit date: 2018     Years since quittin.4    Smokeless tobacco: Never   Substance and Sexual Activity    Alcohol use: Never    Drug use: Never    Sexual activity: Not Currently     Partners: Female     Birth control/protection: Abstinence, None   Social History Narrative    ** Merged History Encounter **          Social Drivers of Health     Financial Resource Strain: Low Risk  (2024)    Overall Financial Resource Strain  (CARDIA)     Difficulty of Paying Living Expenses: Not very hard   Food Insecurity: No Food Insecurity (4/22/2024)    Hunger Vital Sign     Worried About Running Out of Food in the Last Year: Never true     Ran Out of Food in the Last Year: Never true   Transportation Needs: No Transportation Needs (4/22/2024)    PRAPARE - Transportation     Lack of Transportation (Medical): No     Lack of Transportation (Non-Medical): No   Physical Activity: Insufficiently Active (4/22/2024)    Exercise Vital Sign     Days of Exercise per Week: 3 days     Minutes of Exercise per Session: 30 min   Stress: No Stress Concern Present (4/22/2024)    [employer] - Occupational Stress Questionnaire     Feeling of Stress : Not at all   Housing Stability: Unknown (4/13/2023)    Housing Stability Vital Sign     Unable to Pay for Housing in the Last Year: No     Unstable Housing in the Last Year: No      [facility] - S Jluis / Shabbir S /D Romina/ S Denys/ S Chano/ Andrés Perkins / M Tristianu/ O Gladis  service -[phone], office [phone]  ---------------------------------------------------------------------------------------------------------------    Patient seen and examined bedside    Subjective and Objective: No overnight events, sob resolved. No complaints today. feeling better    Allergies: No Known Allergies      Hospital Medications:   MEDICATIONS  (STANDING):  aspirin  chewable 81 milliGRAM(s) Oral daily  atorvastatin 40 milliGRAM(s) Oral at bedtime  cadexomer iodine 0.9% Gel 1 Application(s) Topical daily  chlorhexidine 2% Cloths 1 Application(s) Topical daily  clopidogrel Tablet 75 milliGRAM(s) Oral daily  Dakins Solution - 1/2 Strength 1 Application(s) Topical daily  epoetin niesha-epbx (RETACRIT) Injectable 62121 Unit(s) IV Push <User Schedule>  heparin   Injectable 5000 Unit(s) SubCutaneous every 12 hours  multivitamin 1 Tablet(s) Oral daily  sevelamer carbonate 800 milliGRAM(s) Oral three times a day with meals    VITALS:  T(F): 98.4 (05-09-22 @ 05:03), Max: 98.4 (05-09-22 @ 05:03)  HR: 69 (05-09-22 @ 05:03)  BP: 136/52 (05-09-22 @ 05:03)  RR: 17 (05-09-22 @ 05:03)  SpO2: 99% (05-09-22 @ 05:03)  Wt(kg): --    PHYSICAL EXAM:  Constitutional: NAD  HEENT: anicteric sclera  Neck: No JVD  Respiratory: CTA b/l, no wheezes  Cardiovascular: S1, S2, RRR  Gastrointestinal: BS+, soft, NT  Extremities: No peripheral edema  Neurological: A/O x 3  Psychiatric: Normal mood, normal affect  : No salcido.   Vascular Access: thigh AVG+    LABS:  05-09    137  |  93<L>  |  65<H>  ----------------------------<  95  4.4   |  24  |  8.30<H>    Ca    10.0      09 May 2022 05:51  Phos  6.3     05-09  Mg     2.60     05-09      Creatinine Trend: 8.30 <--, 6.14 <--, 8.22 <--, 6.01 <--, 8.08 <--, 6.08 <--, 7.97 <--                        11.0   8.26  )-----------( 320      ( 09 May 2022 05:53 )             36.2     Urine Studies:        RADIOLOGY & ADDITIONAL STUDIES:

## 2025-07-24 ENCOUNTER — OFFICE VISIT (OUTPATIENT)
Dept: ORTHOPEDICS | Facility: CLINIC | Age: 65
End: 2025-07-24
Attending: ORTHOPAEDIC SURGERY
Payer: COMMERCIAL

## 2025-07-24 DIAGNOSIS — M54.50 LOW BACK PAIN, UNSPECIFIED BACK PAIN LATERALITY, UNSPECIFIED CHRONICITY, UNSPECIFIED WHETHER SCIATICA PRESENT: Primary | ICD-10-CM

## 2025-07-24 PROCEDURE — 98004 SYNCH AUDIO-VIDEO EST SF 10: CPT | Mod: 95,,, | Performed by: ORTHOPAEDIC SURGERY
